# Patient Record
Sex: FEMALE | Race: WHITE | NOT HISPANIC OR LATINO | Employment: UNEMPLOYED | URBAN - METROPOLITAN AREA
[De-identification: names, ages, dates, MRNs, and addresses within clinical notes are randomized per-mention and may not be internally consistent; named-entity substitution may affect disease eponyms.]

---
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
## 2018-01-12 ENCOUNTER — OFFICE VISIT (OUTPATIENT)
Dept: PEDIATRICS | Facility: CLINIC | Age: 18
End: 2018-01-12
Payer: COMMERCIAL

## 2018-01-12 VITALS
BODY MASS INDEX: 22.78 KG/M2 | DIASTOLIC BLOOD PRESSURE: 72 MMHG | HEIGHT: 69 IN | WEIGHT: 153.8 LBS | TEMPERATURE: 97.6 F | HEART RATE: 60 BPM | OXYGEN SATURATION: 99 % | SYSTOLIC BLOOD PRESSURE: 115 MMHG

## 2018-01-12 DIAGNOSIS — Z30.42 ENCOUNTER FOR SURVEILLANCE OF INJECTABLE CONTRACEPTIVE: Primary | ICD-10-CM

## 2018-01-12 DIAGNOSIS — F41.9 ANXIETY: ICD-10-CM

## 2018-01-12 DIAGNOSIS — F32.A DEPRESSION, UNSPECIFIED DEPRESSION TYPE: ICD-10-CM

## 2018-01-12 LAB — BETA HCG QUAL IFA URINE: NEGATIVE

## 2018-01-12 PROCEDURE — 99214 OFFICE O/P EST MOD 30 MIN: CPT | Performed by: PEDIATRICS

## 2018-01-12 PROCEDURE — 87491 CHLMYD TRACH DNA AMP PROBE: CPT | Performed by: PEDIATRICS

## 2018-01-12 PROCEDURE — 84703 CHORIONIC GONADOTROPIN ASSAY: CPT | Performed by: PEDIATRICS

## 2018-01-12 RX ORDER — BUSPIRONE HYDROCHLORIDE 5 MG/1
TABLET ORAL
Qty: 150 TABLET | Refills: 0 | Status: SHIPPED | OUTPATIENT
Start: 2018-01-12 | End: 2018-04-03

## 2018-01-12 RX ORDER — MEDROXYPROGESTERONE ACETATE 150 MG/ML
150 INJECTION, SUSPENSION INTRAMUSCULAR
Qty: 3 ML | Refills: 3 | OUTPATIENT
Start: 2018-01-12 | End: 2018-04-03

## 2018-01-12 ASSESSMENT — ANXIETY QUESTIONNAIRES
6. BECOMING EASILY ANNOYED OR IRRITABLE: NEARLY EVERY DAY
GAD7 TOTAL SCORE: 11
1. FEELING NERVOUS, ANXIOUS, OR ON EDGE: MORE THAN HALF THE DAYS
2. NOT BEING ABLE TO STOP OR CONTROL WORRYING: SEVERAL DAYS
5. BEING SO RESTLESS THAT IT IS HARD TO SIT STILL: NOT AT ALL
7. FEELING AFRAID AS IF SOMETHING AWFUL MIGHT HAPPEN: SEVERAL DAYS
GAD7 TOTAL SCORE: 11
4. TROUBLE RELAXING: NEARLY EVERY DAY
7. FEELING AFRAID AS IF SOMETHING AWFUL MIGHT HAPPEN: SEVERAL DAYS
3. WORRYING TOO MUCH ABOUT DIFFERENT THINGS: SEVERAL DAYS
GAD7 TOTAL SCORE: 11

## 2018-01-12 ASSESSMENT — PATIENT HEALTH QUESTIONNAIRE - PHQ9
SUM OF ALL RESPONSES TO PHQ QUESTIONS 1-9: 16
SUM OF ALL RESPONSES TO PHQ QUESTIONS 1-9: 16
10. IF YOU CHECKED OFF ANY PROBLEMS, HOW DIFFICULT HAVE THESE PROBLEMS MADE IT FOR YOU TO DO YOUR WORK, TAKE CARE OF THINGS AT HOME, OR GET ALONG WITH OTHER PEOPLE: VERY DIFFICULT

## 2018-01-12 NOTE — PATIENT INSTRUCTIONS
Start at 5 mg twice daily for 3 days, then 7.5 mg (1.5 tabs) twice daily for 3 days, then 10 mg (2 tabs) twice daily for 3 days, then 12.5 mg (2.5 tabs) twice daily for 3 days, then 15 mg (3 tabs) twice daily and stay at that dose.

## 2018-01-12 NOTE — NURSING NOTE
The following medication was given:     MEDICATION: Medroxyprogesterone 150 mg  ROUTE: IM  SITE: [facility]  DOSE: 150 mg  LOT #: [id number]  :  Ebony  EXPIRATION DATE:  05/2019  NDC#: 1272-0149-61

## 2018-01-12 NOTE — PROGRESS NOTES
SUBJECTIVE:   Saritha Ferrera is a 17 year old female who presents to clinic today with self because of:    Chief Complaint   Patient presents with     Contraception     Recheck Medication     follow up to effexor         HPI  Concerns: Follow up to effexor, pt stopped taking the effexor due to some complications form it. Pt is late for Depo shot, was sexually active yesterday, but took a plan b     Answers for HPI/ROS submitted by the patient on 1/12/2018   If you checked off any problems, how difficult have these problems made it for you to do your work, take care of things at home, or get along with other people?: Very difficult  PHQ9 TOTAL SCORE: 16  BRUNO 7 TOTAL SCORE: 11    ==============================================================  Saritha s here with 2 concerns.  1)  Saritha her boyfriend is past due for her next Depo-Provera vaccine.  Was in [facility] for a month, but has gotten out and she has been sexually active with him in the last few days.  She did use Plan B.  No symptoms of pregnancy.  No symptoms of STD.  She would like to get restarted on Depakote.  No side effects from the upper.    2)Saritha is also here for follow-up of her depression and anxiety.  Most recently, she was on Effexor but has been off for the last 3 weeks.  She states that Effexor made her very irrational, and making her do things without thinking.  Once, she had to be talked out of doing something that was harmful to herself.  Denies any suicidal ideation thoughts, or plan.  She has been on several other antidepressant/antianxiety medications in the past.   -Prozac 40 mg daily, she discontinued it because she was getting very irritable and normal life situations, as well as having a panic attack.   -Zoloft, works well for her for a while but then she stopped it because it was making her paranoid.      She uses marijuana, but denies any other drug use.  I recommended therapy several times for her, but she does not wish to go.    Saritha did  "readily contract for safety with me today.     ROS  Constitutional, eye, ENT, skin, respiratory, cardiac, and GI are normal except as otherwise noted.      PROBLEM LIST  Patient Active Problem List    Diagnosis Date Noted     Anxiety 09/13/2017     Priority: Medium     Migraine with aura 04/10/2015     Priority: Medium     Contraception management 05/07/2014     Priority: Medium     Adjustment disorder with depressed mood 11/04/2013     Priority: Medium      MEDICATIONS  Current Outpatient Prescriptions   Medication Sig Dispense Refill     busPIRone (BUSPAR) 5 MG tablet 1 tab bid for 3 days, then 1.5 tabs bid for 3d, then 2 tabs bid for 3d, then 2.5 tabs bid for 3d, then 3 tabs bid 150 tablet 0     medroxyPROGESTERone (DEPO-PROVERA) 150 MG/ML injection Inject 1 mL (150 mg) into the muscle every 3 months 3 mL 3     medroxyPROGESTERone (DEPO-PROVERA) 150 MG/ML injection Inject 1 mL (150 mg) into the muscle every 3 months 3 mL 3     venlafaxine (EFFEXOR-XR) 37.5 MG 24 hr capsule Take 1 capsule (37.5 mg) by mouth daily (Patient not taking: Reported on 1/12/2018) 90 capsule 0      ALLERGIES  Allergies   Allergen Reactions     No Known Drug Allergies        Reviewed and updated as needed this visit by clinical staff  Tobacco  Allergies  Meds  Problems         Reviewed and updated as needed this visit by Provider  Allergies  Meds  Problems       OBJECTIVE:     /72  Pulse 60  Temp 97.6  F (36.4  C) (Oral)  Ht 5' 9\" (1.753 m)  Wt 153 lb 12.8 oz (69.8 kg)  SpO2 99%  BMI 22.71 kg/m2  97 %ile based on CDC 2-20 Years stature-for-age data using vitals from 1/12/2018.  83 %ile based on CDC 2-20 Years weight-for-age data using vitals from 1/12/2018.  61 %ile based on CDC 2-20 Years BMI-for-age data using vitals from 1/12/2018.  Blood pressure percentiles are 61.0 % systolic and 74.0 % diastolic based on NHBPEP's 4th Report.   (This patient's height is above the 95th percentile. The blood pressure percentiles " above assume this patient to be in the 95th percentile.)    GENERAL:  Alert and interactive., EYES:  Normal extra-ocular movements.  PERRLA, LUNGS:  Clear, HEART:  Normal rate and rhythm.  Normal S1 and S2.  No murmurs. and NEURO:  No tics or tremor.  Normal tone and strength. Normal gait and balance.     DIAGNOSTICS:   Results for orders placed or performed in visit on 01/12/18 (from the past 24 hour(s))   Beta HCG qual IFA urine   Result Value Ref Range    Beta HCG Qual IFA Urine Negative NEG^Negative          ASSESSMENT/PLAN:   1. Encounter for surveillance of injectable contraceptive  - Beta HCG qual IFA urine  - Chlamydia trachomatis PCR  - medroxyPROGESTERone (DEPO-PROVERA) 150 MG/ML injection; Inject 1 mL (150 mg) into the muscle every 3 months  Dispense: 3 mL; Refill: 3    2. Anxiety  - busPIRone (BUSPAR) 5 MG tablet; 1 tab bid for 3 days, then 1.5 tabs bid for 3d, then 2 tabs bid for 3d, then 2.5 tabs bid for 3d, then 3 tabs bid  Dispense: 150 tablet; Refill: 0  - MENTAL HEALTH REFERRAL  - Adult; Psychiatry and Medication Management; Psychiatry; [facility]: [facility] - [place], [place] [phone]  Medication management & future refills will be returned to [facility] PCP upon compl...    This is not effective, could also consider Remeron by itself or in addition to Effexor.,  I spoke with the [facility] psychiatric advice line in November, please see that note for detailed recommendations.      3. Depression, unspecified depression type  - MENTAL HEALTH REFERRAL  - Adult; Psychiatry and Medication Management; Psychiatry; [facility]: [facility] - [place], [place] [phone]  Medication management & future refills will be returned to [facility] PCP upon compl...    FOLLOW UP: In 1 month, if medications not effective could consider follow-up with psychiatry.  Referral made.    Sheron Meyer MD

## 2018-01-12 NOTE — MR AVS SNAPSHOT
After Visit Summary   1/12/2018    Saritha Ferrera    MRN: [id number]           Patient Information     Date Of Birth          2000        Visit Information        Provider Department      1/12/2018 1:40 PM Sheron Meyer MD [facility]        Today's Diagnoses     Encounter for surveillance of injectable contraceptive    -  1    Anxiety        Depression, unspecified depression type          Care Instructions    Start at 5 mg twice daily for 3 days, then 7.5 mg (1.5 tabs) twice daily for 3 days, then 10 mg (2 tabs) twice daily for 3 days, then 12.5 mg (2.5 tabs) twice daily for 3 days, then 15 mg (3 tabs) twice daily and stay at that dose.          Follow-ups after your visit        Additional Services     MENTAL HEALTH REFERRAL  - Adult; Psychiatry and Medication Management; Psychiatry; [facility]: Collaborative Care Psychiatry Service   [place], [place] [phone]  Medication management & future refills will be returned to [facility] PCP upon compl...       All scheduling is subject to the client's specific insurance plan & benefits, provider/location availability, and provider clinical specialities.  Please arrive 15 minutes early for your first appointment and bring your completed paperwork.    Please be aware that coverage of these services is subject to the terms and limitations of your health insurance plan.  Call member services at your health plan with any benefit or coverage questions.                            Follow-up notes from your care team     Return in about 1 month (around 2/12/2018) for recheck.      Who to contact     If you have questions or need follow up information about today's clinic visit or your schedule please contact [facility] directly at [phone].  Normal or non-critical lab and imaging results will be communicated to you by MyChart, letter or phone within 4 business days after the clinic has received the results.  "If you do not hear from us within 7 days, please contact the clinic through Contrib or phone. If you have a critical or abnormal lab result, we will notify you by phone as soon as possible.  Submit refill requests through Contrib or call your pharmacy and they will forward the refill request to us. Please allow 3 business days for your refill to be completed.          Additional Information About Your Visit        Contrib Information     Contrib lets you send messages to your doctor, view your test results, renew your prescriptions, schedule appointments and more. To sign up, go to [email or web address] . Click on \"Log in\" on the left side of the screen, which will take you to the Welcome page. Then click on \"Sign up Now\" on the right side of the page.     You will be asked to enter the access code listed below, as well as some personal information. Please follow the directions to create your username and password.     Your access code is: [email or web address]  Expires: 2018  2:33 PM     Your access code will  in 90 days. If you need help or a new code, please call your [facility] clinic or [phone].        Care EveryWhere ID     This is your Care EveryWhere ID. This could be used by other organizations to access your [facility] medical records  [id number]        Your Vitals Were     Pulse Temperature Height Pulse Oximetry BMI (Body Mass Index)       60 97.6  F (36.4  C) (Oral) 5' 9\" (1.753 m) 99% 22.71 kg/m2        Blood Pressure from Last 3 Encounters:   18 115/72   17 102/67   10/09/17 122/80    Weight from Last 3 Encounters:   18 153 lb 12.8 oz (69.8 kg) (83 %)*   17 147 lb 14.4 oz (67.1 kg) (79 %)*   10/09/17 147 lb 4.8 oz (66.8 kg) (78 %)*     * Growth percentiles are based on [facility] 2-20 Years data.              We Performed the Following     Beta HCG qual IFA urine     Chlamydia trachomatis PCR     MENTAL HEALTH REFERRAL  - Adult; Psychiatry and Medication Management; Psychiatry; " G: Collaborative Care Psychiatry Service   [place], [place] [phone]  Medication management & future refills will be returned to [facility] PCP upon compl...          Today's Medication Changes          These changes are accurate as of: 1/12/18  2:23 PM.  If you have any questions, ask your nurse or doctor.               Start taking these medicines.        Dose/Directions    busPIRone 5 MG tablet   Commonly known as:  BUSPAR   Used for:  Anxiety   Started by:  Sheron Meyer MD        1 tab bid for 3 days, then 1.5 tabs bid for 3d, then 2 tabs bid for 3d, then 2.5 tabs bid for 3d, then 3 tabs bid   Quantity:  150 tablet   Refills:  0         These medicines have changed or have updated prescriptions.        Dose/Directions    * medroxyPROGESTERone 150 MG/ML injection   Commonly known as:  DEPO-PROVERA   This may have changed:  Another medication with the same name was added. Make sure you understand how and when to take each.   Used for:  Unwanted fertility, Adjustment disorder with depressed mood   Changed by:  Sheron Meyer MD        Dose:  150 mg   Inject 1 mL (150 mg) into the muscle every 3 months   Quantity:  3 mL   Refills:  3       * medroxyPROGESTERone 150 MG/ML injection   Commonly known as:  DEPO-PROVERA   This may have changed:  You were already taking a medication with the same name, and this prescription was added. Make sure you understand how and when to take each.   Used for:  Encounter for surveillance of injectable contraceptive   Changed by:  Sheron Meyer MD        Dose:  150 mg   Inject 1 mL (150 mg) into the muscle every 3 months   Quantity:  3 mL   Refills:  3       * Notice:  This list has 2 medication(s) that are the same as other medications prescribed for you. Read the directions carefully, and ask your doctor or other care provider to review them with you.         Where to get your medicines      These medications were sent to [facility] [address] [facility] [address] AT [facility] [address]  [address], [facility] [address]    Hours:  24-hours Phone:  [phone]     busPIRone 5 MG tablet         Some of these will need a paper prescription and others can be bought over the counter.  Ask your nurse if you have questions.     You don't need a prescription for these medications     medroxyPROGESTERone 150 MG/ML injection                Primary Care Provider Office Phone # Fax #    Sheron Meyer [phone]       [address] [facility] [address]        Equal Access to Services     JOSE LAMBERT : Hadii aad ku hadasho Soomaali, waaxda luqadaha, qaybta kaalmada adeegyada, waxfransisca reyes hayaan ademonica jenkins . So [place] [phone].    ATENCIÓN: Si habla español, tiene a day disposición servicios gratuitos de asistencia lingüística. [facility] [phone].    We comply with applicable federal civil rights laws and [place] laws. We do not discriminate on the basis of race, color, national origin, age, disability, sex, sexual orientation, or gender identity.            Thank you!     Thank you for choosing [facility]  for your care. Our goal is always to provide you with excellent care. Hearing back from our patients is one way we can continue to improve our services. Please take a few minutes to complete the written survey that you may receive in the mail after your visit with us. Thank you!             Your Updated Medication List - Protect others around you: Learn how to safely use, store and throw away your medicines at www.disposemymeds.org.          This list is accurate as of: 1/12/18  2:23 PM.  Always use your most recent med list.                   Brand Name Dispense Instructions for use Diagnosis    busPIRone 5 MG tablet    BUSPAR    150 tablet    1 tab bid for 3 days, then 1.5 tabs bid for 3d, then 2 tabs bid for 3d, then 2.5 tabs bid for 3d, then 3 tabs bid    Anxiety       * medroxyPROGESTERone 150  MG/ML injection    DEPO-PROVERA    3 mL    Inject 1 mL (150 mg) into the muscle every 3 months    Unwanted fertility, Adjustment disorder with depressed mood       * medroxyPROGESTERone 150 MG/ML injection    DEPO-PROVERA    3 mL    Inject 1 mL (150 mg) into the muscle every 3 months    Encounter for surveillance of injectable contraceptive       venlafaxine 37.5 MG 24 hr capsule    EFFEXOR-XR    90 capsule    Take 1 capsule (37.5 mg) by mouth daily    Adjustment disorder with depressed mood       * Notice:  This list has 2 medication(s) that are the same as other medications prescribed for you. Read the directions carefully, and ask your doctor or other care provider to review them with you.

## 2018-01-13 ASSESSMENT — PATIENT HEALTH QUESTIONNAIRE - PHQ9: SUM OF ALL RESPONSES TO PHQ QUESTIONS 1-9: 16

## 2018-01-13 ASSESSMENT — ANXIETY QUESTIONNAIRES: GAD7 TOTAL SCORE: 11

## 2018-01-14 LAB
C TRACH DNA SPEC QL NAA+PROBE: NEGATIVE
SPECIMEN SOURCE: NORMAL

## 2018-01-15 NOTE — PROGRESS NOTES
Please call patient on her personal cell phone and let her know that her routine chlamydia testing was negative.  We recommend repeat testing every 6 months in this age group.      Electronically signed by:  Sheron Meyer MD  Pediatrics  [facility]

## 2018-02-03 ENCOUNTER — HOSPITAL ENCOUNTER (EMERGENCY)
Facility: CLINIC | Age: 18
Discharge: HOME | End: 2018-02-03
Attending: EMERGENCY MEDICINE | Admitting: EMERGENCY MEDICINE
Payer: OTHER MISCELLANEOUS

## 2018-02-03 ENCOUNTER — APPOINTMENT (OUTPATIENT)
Dept: CT IMAGING | Facility: CLINIC | Age: 18
End: 2018-02-03
Attending: EMERGENCY MEDICINE
Payer: OTHER MISCELLANEOUS

## 2018-02-03 VITALS
SYSTOLIC BLOOD PRESSURE: 112 MMHG | BODY MASS INDEX: 22.22 KG/M2 | HEART RATE: 85 BPM | TEMPERATURE: 98.8 F | WEIGHT: 150 LBS | HEIGHT: 69 IN | DIASTOLIC BLOOD PRESSURE: 74 MMHG | OXYGEN SATURATION: 98 % | RESPIRATION RATE: 16 BRPM

## 2018-02-03 DIAGNOSIS — S01.81XA FACIAL LACERATION, INITIAL ENCOUNTER: ICD-10-CM

## 2018-02-03 DIAGNOSIS — S06.0X0A CONCUSSION WITHOUT LOSS OF CONSCIOUSNESS, INITIAL ENCOUNTER: ICD-10-CM
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
## 2018-02-03 PROCEDURE — 70450 CT HEAD/BRAIN W/O DYE: CPT

## 2018-02-03 PROCEDURE — 90471 IMMUNIZATION ADMIN: CPT

## 2018-02-03 PROCEDURE — 25000132 ZZH RX MED GY IP 250 OP 250 PS 637: Performed by: EMERGENCY MEDICINE

## 2018-02-03 PROCEDURE — 99284 EMERGENCY DEPT VISIT MOD MDM: CPT | Mod: 25

## 2018-02-03 PROCEDURE — 90715 TDAP VACCINE 7 YRS/> IM: CPT | Performed by: EMERGENCY MEDICINE

## 2018-02-03 PROCEDURE — 12013 RPR F/E/E/N/L/M 2.6-5.0 CM: CPT

## 2018-02-03 PROCEDURE — 25000128 H RX IP 250 OP 636: Performed by: EMERGENCY MEDICINE

## 2018-02-03 RX ORDER — LORAZEPAM 0.5 MG/1
0.5 TABLET ORAL ONCE
Status: COMPLETED | OUTPATIENT
Start: 2018-02-03 | End: 2018-02-03

## 2018-02-03 RX ADMIN — CLOSTRIDIUM TETANI TOXOID ANTIGEN (FORMALDEHYDE INACTIVATED), CORYNEBACTERIUM DIPHTHERIAE TOXOID ANTIGEN (FORMALDEHYDE INACTIVATED), BORDETELLA PERTUSSIS TOXOID ANTIGEN (GLUTARALDEHYDE INACTIVATED), BORDETELLA PERTUSSIS FILAMENTOUS HEMAGGLUTININ ANTIGEN (FORMALDEHYDE INACTIVATED), BORDETELLA PERTUSSIS PERTACTIN ANTIGEN, AND BORDETELLA PERTUSSIS FIMBRIAE 2/3 ANTIGEN 0.5 ML: 5; 2; 2.5; 5; 3; 5 INJECTION, SUSPENSION INTRAMUSCULAR at 20:35

## 2018-02-03 RX ADMIN — LORAZEPAM 0.5 MG: 0.5 TABLET ORAL at 20:41

## 2018-02-03 ASSESSMENT — ENCOUNTER SYMPTOMS
NECK PAIN: 0
NUMBNESS: 0
WOUND: 1
CONFUSION: 1
WEAKNESS: 0
BACK PAIN: 0
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
## 2018-02-03 NOTE — ED AVS SNAPSHOT
Emergency Department    [address] [place] [address]    Phone:  [phone]    Fax:  [phone]                                       Saritha Ferrera   MRN: [id number]    Department:   Emergency Department   Date of Visit:  2/3/2018           After Visit Summary Signature Page     I have received my discharge instructions, and my questions have been answered. I have discussed any challenges I see with this plan with the nurse or doctor.    ..........................................................................................................................................  Patient/Patient Representative Signature      ..........................................................................................................................................  Patient Representative Print Name and Relationship to Patient    ..................................................               ................................................  Date                                            Time    ..........................................................................................................................................  Reviewed by Signature/Title    ...................................................              ..............................................  Date                                                            Time
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
## 2018-02-04 NOTE — DISCHARGE INSTRUCTIONS
"Concussion Discharge Instructions  You were seen today for signs of a concussion. The symptoms will vary, depending on the nature of your injury and your health. You may have: headache, confusion, nausea (feel sick to your stomach), vomiting (throwing up) and problems with memory, concentrating or sleep. You may feel dizzy, irritable, and tired.   Children and teens may need help from their parents, teachers and coaches to watch for symptoms as they recover.  Follow-up  It is important for you to see a doctor for follow-up care to see how you are recovering. Please see your primary doctor within the next 5 to 7 days. You may have also been referred to the Concussion  service. They will contact you and arrange a follow-up visit if needed. If you need help sooner, you may call them at [phone].  Warning signs  Call your doctor or come back to Emergency if you suddenly have any of these symptoms:    Headaches that get worse    Feeling more and more drowsy    You keep repeating yourself    Strange behavior    Seizures    Repeat vomiting (throwing up)    Trouble walking    Growing confusion    Feeling more irritable    Neck pain that gets worse    Slurred speech    Weakness or numbness    Loss of consciousness    Fluid or blood coming from ears or nose  Self-care    Get lots of rest and get enough sleep at night. Take daytime naps or rest if you feel tired.    Limit physical activity and \"thinking\" activities. These can make symptoms worse.    Physical activity includes gym, sports, weight training, running, exercise and heavy lifting.    Thinking activities include homework, class work, job-related work and screen time (phone, computer, tablet, TV and video games).    Stick to a healthy diet and drink lots of fluids.    As symptoms improve, you may slowly return to your daily activities. If symptoms get worse   or return, reduce your activity.    Know that it is normal to feel sad and frustrated when you do " not feel right and are less active.  Going back to work    Your care team will tell you when you are ready to return to work.    Limit the amount of work you do soon after your injury. This may speed healing. Take breaks if your symptoms get worse. You should also reduce your physical activity as well as activities that require a lot of thinking until you see your doctor.    You may need shorter work days and a lighter workload.    Avoid heavy lifting, working with machinery, driving and working at heights until your symptoms are gone or you are cleared by a doctor.  Returning to sports    Never return to play if you have any symptoms. A full recovery will reduce the chances of getting hurt again. Remember, it is better to miss one or two games than a whole season.    You should rest from all physical activity until you see your doctor. Generally, if all symptoms have completely cleared, your doctor can help guide you to slowly return to sports. If symptoms return or worsen, stop the activity and see your doctor.    Important: If you are in an organized sport and under age 18, you will need written consent from a healthcare provider before you return to sports. Typically, this will be your primary care or sports medicine doctor. Please make an appointment.  Going back to school    If you are still having symptoms, you may need extra help at school.    Tell your teachers and school nurse about your injury and symptoms. Ask them to watch for problems with learning, memory and concentrating. Symptoms may get worse when you do schoolwork, and you may become more irritable.    You may need shorter school days, a reduced workload, and to postpone testing.    Do not drive or take gym class (physical activity) until cleared by a doctor.  For informational purposes only. Not to replace the advice of your health care provider.   2009 [employer]. Used with permission. This form is adapted  "from the \"Heads Up: Brain Injury in Your Practice\" tool kit developed by the Centers for Disease Control and Prevention (CDC). All rights reserved. [employer] 911560ne - Rev 03/17.  Discharge Instructions  Laceration (Cut)    You were seen today for a laceration (cut).  Your doctor examined your laceration for any problems such a buried foreign body (like glass, a splinter, or gravel), or injury to blood vessels, tendons, and nerves.  Your doctor may have also rinsed and/or scrubbed your laceration to help prevent an infection.  Your laceration may have been closed with glue, staples or sutures (stitches).      It may not be possible to find all problems with your laceration on the first visit, and we can't always prevent infections.  Antibiotics are only given when the benefit is more than the risk, and don't prevent all infections. Some lacerations are too high risk to close, and are left open to heal.  All lacerations, no matter how expertly repaired, will cause scarring.    Return to the Emergency Department right away if:    You have more redness, swelling, pain, drainage (pus), a bad smell, or red streaking from your laceration.      You have a fever of 101oF or more.    You have bleeding that you can t stop at home. If your cut starts to bleed, hold pressure on the bleeding area with a clean cloth or put pressure over the bandage.  If the bleeding doesn t stop after using constant pressure for 30 minutes, you should return to the Emergency Department for further treatment.    An area past the laceration is cool, pale, or blue compared with the other side, or has a slower return of color when squeezed.    Your dressing seems too tight or starts to get uncomfortable or painful.    You have loss of normal function or use of an area, such as being unable to straighten or bend a finger normally.    You have a numb area past the laceration.    Return to the Emergency Department or see your " regular doctor if:    The laceration starts to come open.     You have something coming out of the cut or a feeling that there is something in the laceration.    Your wound will not heal, or keeps breaking open. There can always be glass, wood, dirt or other things in any wound.  They won t always show up, even on x-rays.  If a wound doesn t heal, this may be why, and it is important to follow-up with your regular doctor.    Home Care:    Take your dressing off in 12 hours, or as instructed by your doctor, to check your laceration. Remove the dressing sooner if it seems too tight or painful, or if it is getting numb, tingly, or pale past the dressing.    Gently wash your laceration 2 times a day with clean cloth and soap.     It is okay to shower, but do not let the laceration soak in water.      If your laceration was closed with wound adhesive or strips: pat it dry and leave it open to the air.     For all other repairs: after you wash your laceration, or at least 2 times a day, apply bacitracin or other antibiotic ointment to the laceration, then cover it with a Band-Aid  or gauze.    Keep the laceration clean. Wear gloves or other protective clothing if you are around dirt.    Follow-up:    You need to follow-up with your regular doctor in 5 days.    Your sutures or staples need to be removed in 5 days. Schedule an appointment with your regular doctor to have this done.    Scars:  To help minimize scarring:    Wear sunscreen over the healed laceration when out in the sun.    Massage the area regularly.    You may use Vitamin E oil.    Wait a year.  Most scars will start to fade within a year.
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
## 2018-02-10 ENCOUNTER — HOSPITAL ENCOUNTER (EMERGENCY)
Facility: CLINIC | Age: 18
Discharge: HOME | End: 2018-02-10
Attending: EMERGENCY MEDICINE | Admitting: EMERGENCY MEDICINE
Payer: OTHER MISCELLANEOUS

## 2018-02-10 VITALS
HEIGHT: 68 IN | RESPIRATION RATE: 16 BRPM | BODY MASS INDEX: 23.09 KG/M2 | OXYGEN SATURATION: 99 % | TEMPERATURE: 97.1 F | DIASTOLIC BLOOD PRESSURE: 84 MMHG | SYSTOLIC BLOOD PRESSURE: 114 MMHG | WEIGHT: 152.38 LBS

## 2018-02-10 DIAGNOSIS — S06.0X0A CONCUSSION WITHOUT LOSS OF CONSCIOUSNESS, INITIAL ENCOUNTER: ICD-10-CM

## 2018-02-10 PROCEDURE — 25000132 ZZH RX MED GY IP 250 OP 250 PS 637: Performed by: EMERGENCY MEDICINE

## 2018-02-10 PROCEDURE — 99283 EMERGENCY DEPT VISIT LOW MDM: CPT

## 2018-02-10 RX ORDER — IBUPROFEN 600 MG/1
600 TABLET, FILM COATED ORAL ONCE
Status: COMPLETED | OUTPATIENT
Start: 2018-02-10 | End: 2018-02-10

## 2018-02-10 RX ADMIN — IBUPROFEN 600 MG: 600 TABLET ORAL at 06:14

## 2018-02-10 ASSESSMENT — ENCOUNTER SYMPTOMS
SEIZURES: 0
DIZZINESS: 1
ABDOMINAL PAIN: 0
MYALGIAS: 0
SORE THROAT: 0
NECK PAIN: 0
NAUSEA: 1
HEADACHES: 1
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
## 2018-02-10 NOTE — ED AVS SNAPSHOT
Emergency Department    [address] [place] [address]    Phone:  [phone]    Fax:  [phone]                                       Saritha Ferrera   MRN: [id number]    Department:   Emergency Department   Date of Visit:  2/10/2018           After Visit Summary Signature Page     I have received my discharge instructions, and my questions have been answered. I have discussed any challenges I see with this plan with the nurse or doctor.    ..........................................................................................................................................  Patient/Patient Representative Signature      ..........................................................................................................................................  Patient Representative Print Name and Relationship to Patient    ..................................................               ................................................  Date                                            Time    ..........................................................................................................................................  Reviewed by Signature/Title    ...................................................              ..............................................  Date                                                            Time

## 2018-03-21 ENCOUNTER — OFFICE VISIT (OUTPATIENT)
Dept: PEDIATRICS | Facility: CLINIC | Age: 18
End: 2018-03-21
Payer: COMMERCIAL

## 2018-03-21 VITALS
HEART RATE: 70 BPM | WEIGHT: 154.4 LBS | DIASTOLIC BLOOD PRESSURE: 64 MMHG | TEMPERATURE: 98.8 F | OXYGEN SATURATION: 99 % | SYSTOLIC BLOOD PRESSURE: 92 MMHG | BODY MASS INDEX: 23.48 KG/M2
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
## 2018-03-21 DIAGNOSIS — S06.0X9S CONCUSSION WITH LOSS OF CONSCIOUSNESS, SEQUELA (H): ICD-10-CM

## 2018-03-21 DIAGNOSIS — N94.10 DYSPAREUNIA, FEMALE: ICD-10-CM

## 2018-03-21 LAB
SPECIMEN SOURCE: NORMAL
WET PREP SPEC: NORMAL

## 2018-03-21 PROCEDURE — 87210 SMEAR WET MOUNT SALINE/INK: CPT | Performed by: PEDIATRICS

## 2018-03-21 PROCEDURE — 87491 CHLMYD TRACH DNA AMP PROBE: CPT | Performed by: PEDIATRICS

## 2018-03-21 PROCEDURE — 87591 N.GONORRHOEAE DNA AMP PROB: CPT | Performed by: PEDIATRICS

## 2018-03-21 PROCEDURE — 99214 OFFICE O/P EST MOD 30 MIN: CPT | Performed by: PEDIATRICS

## 2018-03-21 ASSESSMENT — PAIN SCALES - GENERAL: PAINLEVEL: SEVERE PAIN (6)
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
## 2018-03-22 LAB
C TRACH DNA SPEC QL NAA+PROBE: NEGATIVE
N GONORRHOEA DNA SPEC QL NAA+PROBE: NEGATIVE
SPECIMEN SOURCE: NORMAL
SPECIMEN SOURCE: NORMAL

## 2018-03-23 NOTE — PROGRESS NOTES
Please call patient on her personal cell phone and let her know that her routine chlamydia and gonorrhea testing was negative.  We recommend repeat testing every 6 months in this age group.      Electronically signed by:  Sheron Meyer MD  Pediatrics  [facility]

## 2018-04-03 ENCOUNTER — OFFICE VISIT (OUTPATIENT)
Dept: PSYCHIATRY | Facility: CLINIC | Age: 18
End: 2018-04-03
Attending: PEDIATRICS
Payer: COMMERCIAL

## 2018-04-03 VITALS
TEMPERATURE: 98.4 F | DIASTOLIC BLOOD PRESSURE: 60 MMHG | HEART RATE: 101 BPM | SYSTOLIC BLOOD PRESSURE: 110 MMHG | WEIGHT: 157 LBS | HEIGHT: 69 IN | BODY MASS INDEX: 23.25 KG/M2 | OXYGEN SATURATION: 98 %

## 2018-04-03 DIAGNOSIS — F41.1 GAD (GENERALIZED ANXIETY DISORDER): Primary | ICD-10-CM

## 2018-04-03 PROBLEM — F41.9 ANXIETY: Status: RESOLVED | Noted: 2017-09-13 | Resolved: 2018-04-03

## 2018-04-03 PROCEDURE — 90792 PSYCH DIAG EVAL W/MED SRVCS: CPT | Performed by: NURSE PRACTITIONER

## 2018-04-03 RX ORDER — PROPRANOLOL HCL 60 MG
60 CAPSULE, EXTENDED RELEASE 24HR ORAL DAILY
Qty: 30 CAPSULE | Refills: 1 | Status: SHIPPED | OUTPATIENT
Start: 2018-04-03 | End: 2018-05-27

## 2018-04-03 RX ORDER — BUPROPION HYDROCHLORIDE 150 MG/1
150 TABLET ORAL EVERY MORNING
Qty: 30 TABLET | Refills: 1 | Status: SHIPPED | OUTPATIENT
Start: 2018-04-03 | End: 2018-05-27

## 2018-04-03 ASSESSMENT — ANXIETY QUESTIONNAIRES
1. FEELING NERVOUS, ANXIOUS, OR ON EDGE: SEVERAL DAYS
2. NOT BEING ABLE TO STOP OR CONTROL WORRYING: MORE THAN HALF THE DAYS
6. BECOMING EASILY ANNOYED OR IRRITABLE: MORE THAN HALF THE DAYS
IF YOU CHECKED OFF ANY PROBLEMS ON THIS QUESTIONNAIRE, HOW DIFFICULT HAVE THESE PROBLEMS MADE IT FOR YOU TO DO YOUR WORK, TAKE CARE OF THINGS AT HOME, OR GET ALONG WITH OTHER PEOPLE: SOMEWHAT DIFFICULT
7. FEELING AFRAID AS IF SOMETHING AWFUL MIGHT HAPPEN: MORE THAN HALF THE DAYS
GAD7 TOTAL SCORE: 12
3. WORRYING TOO MUCH ABOUT DIFFERENT THINGS: MORE THAN HALF THE DAYS
5. BEING SO RESTLESS THAT IT IS HARD TO SIT STILL: SEVERAL DAYS

## 2018-04-03 ASSESSMENT — PATIENT HEALTH QUESTIONNAIRE - PHQ9: 5. POOR APPETITE OR OVEREATING: MORE THAN HALF THE DAYS

## 2018-04-03 NOTE — PATIENT INSTRUCTIONS
Treatment Plan:    Start Wellbutrin (buproprion)  mg by mouth daily in AM for depression and anxiety.     Start Inderal (propranolol) LA 60 mg by mouth daily for headaches and anxiety.     Continue all other medications as reviewed per electronic medical record today.     Safety plan reviewed. To the Emergency Department as needed or call after hours crisis line at [phone] or [phone].     To schedule individual or family therapy, call [facility] at [phone].     Schedule an appointment with me in 8-10 weeks or sooner as needed.  Call [facility] at [phone] to schedule.    Follow up with primary care provider as planned or for acute medical concerns.    Call the psychiatric nurse line with medication questions or concerns at [phone].    [employer] may be used to communicate with your provider, but this is not intended to be used for emergencies.
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
## 2018-04-03 NOTE — MR AVS SNAPSHOT
After Visit Summary   4/3/2018    Saritha Ferrera    MRN: [id number]           Patient Information     Date Of Birth          2000        Visit Information        Provider Department      4/3/2018 9:15 AM Vicky Jones NP [facility]        Today's Diagnoses     BRUNO (generalized anxiety disorder)    -  1      Care Instructions      Treatment Plan:    Start Wellbutrin (buproprion)  mg by mouth daily in AM for depression and anxiety.     Start Inderal (propranolol) LA 60 mg by mouth daily for headaches and anxiety.     Continue all other medications as reviewed per electronic medical record today.     Safety plan reviewed. To the Emergency Department as needed or call after hours crisis line at [phone] or [phone].     To schedule individual or family therapy, call [facility] at [phone].     Schedule an appointment with me in 8-10 weeks or sooner as needed.  Call [facility] at [phone] to schedule.    Follow up with primary care provider as planned or for acute medical concerns.    Call the psychiatric nurse line with medication questions or concerns at [phone].    [employer] may be used to communicate with your provider, but this is not intended to be used for emergencies.          Follow-ups after your visit        Follow-up notes from your care team     Return in about 8 weeks (around 5/29/2018).      Your next 10 appointments already scheduled     Apr 11, 2018  2:30 PM CDT   Nurse Only with [facility]ATRICS - [facility]URSE   [facility]    [address] [id number] [phone]            Jun 06, 2018  2:45 PM CDT   [facility]   [facility]    [address]  [address]  [facility] [address] [phone]              Who to contact     If you have  "questions or need follow up information about today's clinic visit or your schedule please contact [facility] directly at [phone].  Normal or non-critical lab and imaging results will be communicated to you by MyChart, letter or phone within 4 business days after the clinic has received the results. If you do not hear from us within 7 days, please contact the clinic through [employer] or phone. If you have a critical or abnormal lab result, we will notify you by phone as soon as possible.  Submit refill requests through Testin or call your pharmacy and they will forward the refill request to us. Please allow 3 business days for your refill to be completed.          Additional Information About Your Visit        Revolv[employer]Askablogr Information     Testin lets you send messages to your doctor, view your test results, renew your prescriptions, schedule appointments and more. To sign up, go to [email or web address] . Click on \"Log in\" on the left side of the screen, which will take you to the Welcome page. Then click on \"Sign up Now\" on the right side of the page.     You will be asked to enter the access code listed below, as well as some personal information. Please follow the directions to create your username and password.     Your access code is: [email or web address]  Expires: 2018 11:11 PM     Your access code will  in 90 days. If you need help or a new code, please call your [facility] clinic or [phone].        Care EveryWhere ID     This is your Care EveryWhere ID. This could be used by other organizations to access your [facility] medical records  [id number]        Your Vitals Were     Pulse Temperature Height Last Period Pulse Oximetry BMI (Body Mass Index)    101 98.4  F (36.9  C) (Oral) 5' 8.5\" (1.74 m) 02/15/2018 (Approximate) 98% 23.52 kg/m2       Blood Pressure from Last 3 Encounters:   18 110/60   18 92/64   02/10/18 114/84    Weight from Last 3 Encounters:   18 157 lb " (71.2 kg)   03/21/18 154 lb 6.4 oz (70 kg)   02/10/18 152 lb 6 oz (69.1 kg) (82 %)*     * Growth percentiles are based on [facility] 2-20 Years data.              Today, you had the following     No orders found for display         Today's Medication Changes          These changes are accurate as of 4/3/18  1:19 PM.  If you have any questions, ask your nurse or doctor.               Start taking these medicines.        Dose/Directions    buPROPion 150 MG 24 hr tablet   Commonly known as:  WELLBUTRIN XL   Used for:  BRUNO (generalized anxiety disorder)   Started by:  Vicky Jones NP        Dose:  150 mg   Take 1 tablet (150 mg) by mouth every morning   Quantity:  30 tablet   Refills:  1       propranolol 60 MG 24 hr capsule   Commonly known as:  INDERAL LA   Used for:  BRUNO (generalized anxiety disorder)   Started by:  Vicky Jones NP        Dose:  60 mg   Take 1 capsule (60 mg) by mouth daily   Quantity:  30 capsule   Refills:  1            Where to get your medicines      These medications were sent to [facility] [address] [place] [address] [address] [facility] [place] [address] [address], [facility] [address]    Hours:  24-hours Phone:  [phone]     buPROPion 150 MG 24 hr tablet    propranolol 60 MG 24 hr capsule                Primary Care Provider Office Phone # Fax #    Sheron Meyer [phone]       [address] [facility] [address]        Equal Access to Services     JOSE LAMBERT AH: Hadii aad ku hadasho Soomaali, waaxda luqadaha, qaybta kaalmada adeegyada, waxay eric haycassandra awan. So [facility] [phone].    ATENCIÓN: Si habla español, tiene a day disposición servicios gratuitos de asistencia lingüística. Llame al [phone].    We comply with applicable federal civil rights laws and [place] laws. We do not discriminate on the basis of race, color, national origin, age, disability, sex, sexual orientation, or gender identity.            Thank you!      Thank you for choosing [facility]  for your care. Our goal is always to provide you with excellent care. Hearing back from our patients is one way we can continue to improve our services. Please take a few minutes to complete the written survey that you may receive in the mail after your visit with us. Thank you!             Your Updated Medication List - Protect others around you: Learn how to safely use, store and throw away your medicines at www.disposemymeds.org.          This list is accurate as of 4/3/18  1:19 PM.  Always use your most recent med list.                   Brand Name Dispense Instructions for use Diagnosis    buPROPion 150 MG 24 hr tablet    WELLBUTRIN XL    30 tablet    Take 1 tablet (150 mg) by mouth every morning    BRUNO (generalized anxiety disorder)       medroxyPROGESTERone 150 MG/ML injection    DEPO-PROVERA    3 mL    Inject 1 mL (150 mg) into the muscle every 3 months    Unwanted fertility, Adjustment disorder with depressed mood       propranolol 60 MG 24 hr capsule    INDERAL LA    30 capsule    Take 1 capsule (60 mg) by mouth daily    BRUNO (generalized anxiety disorder)

## 2018-04-03 NOTE — Clinical Note
Devan Meyer Thank you for the Psychiatry referral to the [facility] ([facility]). Our psychiatry providers act as a specialty service for Primary Care Providers in the [facility] that seek to optimize medications for unstable patients.  Once medications have been optimized, our providers discharge the patient back to the referring Primary Care Provider for ongoing medication management.  This type of system allows our providers to serve a high volume of patients.   Please see my Impression and Plan.  If you have any questions or concerns, please let me know.  Vicky

## 2018-04-04 ASSESSMENT — PATIENT HEALTH QUESTIONNAIRE - PHQ9: SUM OF ALL RESPONSES TO PHQ QUESTIONS 1-9: 21

## 2018-04-04 ASSESSMENT — ANXIETY QUESTIONNAIRES: GAD7 TOTAL SCORE: 12

## 2018-04-11 ENCOUNTER — ALLIED HEALTH/NURSE VISIT (OUTPATIENT)
Dept: NURSING | Facility: CLINIC | Age: 18
End: 2018-04-11
Payer: COMMERCIAL

## 2018-04-11 DIAGNOSIS — Z30.42 ENCOUNTER FOR SURVEILLANCE OF INJECTABLE CONTRACEPTIVE: Primary | ICD-10-CM

## 2018-04-11 PROCEDURE — 96372 THER/PROPH/DIAG INJ SC/IM: CPT

## 2018-05-10 ENCOUNTER — TELEPHONE (OUTPATIENT)
Dept: PEDIATRICS | Facility: CLINIC | Age: 18
End: 2018-05-10

## 2018-05-10 NOTE — TELEPHONE ENCOUNTER
"Reason for Call:  Request for results:    Name of test or procedure: STD testing    Date of test of procedure: \"most recent testing\"    Location of the test or procedure: [facility]    OK to leave the result message on voice mail or with a family member? YES    Phone number Patient can be reached at:  Cell number on file:    Telephone Information:   Mobile [phone]       Additional comments: Patient will  printed results at  when ready. Patient advised she will be called when ready to .    Call taken on 5/10/2018 at 9:26 AM by Jodee Mercado    "

## 2018-05-27 DIAGNOSIS — F41.1 GAD (GENERALIZED ANXIETY DISORDER): ICD-10-CM

## 2018-05-29 RX ORDER — BUPROPION HYDROCHLORIDE 150 MG/1
TABLET ORAL
Qty: 30 TABLET | Refills: 0 | Status: SHIPPED | OUTPATIENT
Start: 2018-05-29 | End: 2018-06-06

## 2018-05-29 RX ORDER — PROPRANOLOL HCL 60 MG
CAPSULE, EXTENDED RELEASE 24HR ORAL
Qty: 30 CAPSULE | Refills: 0 | Status: SHIPPED | OUTPATIENT
Start: 2018-05-29 | End: 2018-06-06

## 2018-06-06 ENCOUNTER — OFFICE VISIT (OUTPATIENT)
Dept: PSYCHIATRY | Facility: CLINIC | Age: 18
End: 2018-06-06
Payer: COMMERCIAL

## 2018-06-06 VITALS
RESPIRATION RATE: 20 BRPM | DIASTOLIC BLOOD PRESSURE: 60 MMHG | WEIGHT: 155 LBS | HEART RATE: 107 BPM | OXYGEN SATURATION: 98 % | SYSTOLIC BLOOD PRESSURE: 92 MMHG | HEIGHT: 69 IN | TEMPERATURE: 98.5 F | BODY MASS INDEX: 22.96 KG/M2

## 2018-06-06 DIAGNOSIS — F33.1 MAJOR DEPRESSIVE DISORDER, RECURRENT EPISODE, MODERATE (H): Primary | ICD-10-CM

## 2018-06-06 DIAGNOSIS — F41.1 GAD (GENERALIZED ANXIETY DISORDER): ICD-10-CM

## 2018-06-06 PROBLEM — F50.9 EATING DISORDER: Status: ACTIVE | Noted: 2018-06-06

## 2018-06-06 PROBLEM — F12.10 CANNABIS ABUSE, DAILY USE: Status: ACTIVE | Noted: 2018-06-06

## 2018-06-06 PROCEDURE — 99214 OFFICE O/P EST MOD 30 MIN: CPT | Performed by: NURSE PRACTITIONER

## 2018-06-06 RX ORDER — BUPROPION HYDROCHLORIDE 150 MG/1
150 TABLET ORAL EVERY MORNING
Qty: 30 TABLET | Refills: 3 | Status: SHIPPED | OUTPATIENT
Start: 2018-06-06 | End: 2018-08-20

## 2018-06-06 RX ORDER — PROPRANOLOL HCL 60 MG
60 CAPSULE, EXTENDED RELEASE 24HR ORAL DAILY
Qty: 30 CAPSULE | Refills: 3 | Status: SHIPPED | OUTPATIENT
Start: 2018-06-06 | End: 2018-09-24

## 2018-06-06 RX ORDER — CALCIUM CARBONATE 500 MG/1
1 TABLET, CHEWABLE ORAL 2 TIMES DAILY
COMMUNITY
End: 2021-01-20

## 2018-06-06 ASSESSMENT — ANXIETY QUESTIONNAIRES
GAD7 TOTAL SCORE: 5
6. BECOMING EASILY ANNOYED OR IRRITABLE: MORE THAN HALF THE DAYS
3. WORRYING TOO MUCH ABOUT DIFFERENT THINGS: SEVERAL DAYS
2. NOT BEING ABLE TO STOP OR CONTROL WORRYING: NOT AT ALL
GAD7 TOTAL SCORE: 5
1. FEELING NERVOUS, ANXIOUS, OR ON EDGE: SEVERAL DAYS
7. FEELING AFRAID AS IF SOMETHING AWFUL MIGHT HAPPEN: NOT AT ALL
4. TROUBLE RELAXING: SEVERAL DAYS
5. BEING SO RESTLESS THAT IT IS HARD TO SIT STILL: NOT AT ALL
7. FEELING AFRAID AS IF SOMETHING AWFUL MIGHT HAPPEN: NOT AT ALL
GAD7 TOTAL SCORE: 5

## 2018-06-06 ASSESSMENT — PATIENT HEALTH QUESTIONNAIRE - PHQ9
10. IF YOU CHECKED OFF ANY PROBLEMS, HOW DIFFICULT HAVE THESE PROBLEMS MADE IT FOR YOU TO DO YOUR WORK, TAKE CARE OF THINGS AT HOME, OR GET ALONG WITH OTHER PEOPLE: SOMEWHAT DIFFICULT
SUM OF ALL RESPONSES TO PHQ QUESTIONS 1-9: 4
SUM OF ALL RESPONSES TO PHQ QUESTIONS 1-9: 4

## 2018-06-06 NOTE — PATIENT INSTRUCTIONS
Treatment Plan:    Continue Wellbutrin (buproprion)  mg by mouth daily in AM for depression and anxiety.     Continue Inderal (propranolol) LA 60 mg by mouth daily for headaches and anxiety.     Continue all other medications as reviewed per electronic medical record today.     Safety plan reviewed. To the Emergency Department as needed or call after hours crisis line at [phone] or [phone]. [place] Crisis Text Line. Text [place] to 260719 or Suicide LifeLine Chat: suicidepreventionlifeline.org/chat    To schedule individual or family therapy, call [facility] at [phone].     Thank you for our work together in the Psychiatry Collaborative Care Model at [facility]. This is our last visit and I am returning your care back to your Primary Care Provider Sheron Meyer MD. If you are not doing well, please contact your Primary Care Provider office.     Follow up with primary care provider as planned or for acute medical concerns. Recommend follow up with Primary Care Provider 3-6 months.     Call the psychiatric nurse line with medication questions or concerns at [phone].

## 2018-06-06 NOTE — MR AVS SNAPSHOT
After Visit Summary   6/6/2018    Saritha Ferrera    MRN: [id number]           Patient Information     Date Of Birth          2000        Visit Information        Provider Department      6/6/2018 2:45 PM Vicky Jones NP [facility]        Today's Diagnoses     Major depressive disorder, recurrent episode, moderate (H)    -  1    BRUNO (generalized anxiety disorder)          Care Instructions    Treatment Plan:    Continue Wellbutrin (buproprion)  mg by mouth daily in AM for depression and anxiety.     Continue Inderal (propranolol) LA 60 mg by mouth daily for headaches and anxiety.     Continue all other medications as reviewed per electronic medical record today.     Safety plan reviewed. To the Emergency Department as needed or call after hours crisis line at [phone] or [phone]. [place] Crisis Text Line. Text [place] to 156596 or Suicide LifeLine Chat: suicidepreventionlifeline.org/chat    To schedule individual or family therapy, call [facility] at [phone].     Thank you for our work together in the Psychiatry Collaborative Care Model at [facility]. This is our last visit and I am returning your care back to your Primary Care Provider Sheron Meyer MD. If you are not doing well, please contact your Primary Care Provider office.     Follow up with primary care provider as planned or for acute medical concerns. Recommend follow up with Primary Care Provider 3-6 months.     Call the psychiatric nurse line with medication questions or concerns at [phone].          Follow-ups after your visit        Your next 10 appointments already scheduled     Jun 27, 2018  2:30 PM CDT   Nurse Only with [facility]ATRICS - [facility]URSE   [facility]    [address] [address] [phone]              Who to contact     If you have questions or need follow up  "information about today's clinic visit or your schedule please contact [facility] directly at [phone].  Normal or non-critical lab and imaging results will be communicated to you by MyChart, letter or phone within 4 business days after the clinic has received the results. If you do not hear from us within 7 days, please contact the clinic through MyChart or phone. If you have a critical or abnormal lab result, we will notify you by phone as soon as possible.  Submit refill requests through [employer] or call your pharmacy and they will forward the refill request to us. Please allow 3 business days for your refill to be completed.          Additional Information About Your Visit        Care EveryWhere ID     This is your Care EveryWhere ID. This could be used by other organizations to access your [facility] medical records  [id number]        Your Vitals Were     Pulse Temperature Respirations Height Last Period Pulse Oximetry    107 98.5  F (36.9  C) (Oral) 20 5' 8.5\" (1.74 m) 06/04/2018 (Approximate) 98%    BMI (Body Mass Index)                   23.22 kg/m2            Blood Pressure from Last 3 Encounters:   06/06/18 92/60   04/03/18 110/60   03/21/18 92/64    Weight from Last 3 Encounters:   06/06/18 155 lb (70.3 kg)   04/03/18 157 lb (71.2 kg)   03/21/18 154 lb 6.4 oz (70 kg)              Today, you had the following     No orders found for display         Today's Medication Changes          These changes are accurate as of 6/6/18  3:15 PM.  If you have any questions, ask your nurse or doctor.               These medicines have changed or have updated prescriptions.        Dose/Directions    buPROPion 150 MG 24 hr tablet   Commonly known as:  WELLBUTRIN XL   This may have changed:  See the new instructions.   Used for:  BRUNO (generalized anxiety disorder)   Changed by:  Vicky Jones NP        Dose:  150 mg   Take 1 tablet (150 mg) by mouth every morning   Quantity:  30 tablet   Refills:  3 "       propranolol 60 MG 24 hr capsule   Commonly known as:  INDERAL LA   This may have changed:  See the new instructions.   Used for:  BRUNO (generalized anxiety disorder)   Changed by:  Vicky Jones, CR        Dose:  60 mg   Take 1 capsule (60 mg) by mouth daily   Quantity:  30 capsule   Refills:  3            Where to get your medicines      These medications were sent to [facility] [address] [place] [address] [address] AT [facility] [address], [facility] [address]     Phone:  [phone]     buPROPion 150 MG 24 hr tablet    propranolol 60 MG 24 hr capsule                Primary Care Provider Office Phone # Fax #    Sheron Meyer [phone]       [address] [facility] [address]        Equal Access to Services     JOSE LAMBERT : Hadii zurdo snyder hadasho Soomaali, waaxda luqadaha, qaybta kaalmada adeegyada, yaima jenkins . So [facility] [phone].    ATENCIÓN: Si habla español, tiene a day disposición servicios gratuitos de asistencia lingüística. [facility] [phone].    We comply with applicable federal civil rights laws and [place] laws. We do not discriminate on the basis of race, color, national origin, age, disability, sex, sexual orientation, or gender identity.            Thank you!     Thank you for choosing [facility]  for your care. Our goal is always to provide you with excellent care. Hearing back from our patients is one way we can continue to improve our services. Please take a few minutes to complete the written survey that you may receive in the mail after your visit with us. Thank you!             Your Updated Medication List - Protect others around you: Learn how to safely use, store and throw away your medicines at www.disposemymeds.org.          This list is accurate as of 6/6/18  3:15 PM.  Always use your most recent med list.                   Brand Name Dispense Instructions for use Diagnosis     ADVIL PO           buPROPion 150 MG 24 hr tablet    WELLBUTRIN XL    30 tablet    Take 1 tablet (150 mg) by mouth every morning    BRUNO (generalized anxiety disorder)       calcium carbonate 500 MG chewable tablet    TUMS     Take 1 chew tab by mouth 2 times daily        medroxyPROGESTERone 150 MG/ML injection    DEPO-PROVERA    3 mL    Inject 1 mL (150 mg) into the muscle every 3 months    Unwanted fertility, Adjustment disorder with depressed mood       propranolol 60 MG 24 hr capsule    INDERAL LA    30 capsule    Take 1 capsule (60 mg) by mouth daily    BRUNO (generalized anxiety disorder)

## 2018-06-06 NOTE — NURSING NOTE
"Chief Complaint   Patient presents with     RECHECK     pt doing well no new concerns     initial BP 92/60 (BP Location: Right arm, Patient Position: Chair, Cuff Size: Adult Regular)  Pulse 107  Temp 98.5  F (36.9  C) (Oral)  Resp 20  Ht 5' 8.5\" (1.74 m)  Wt 155 lb (70.3 kg)  LMP 06/04/2018 (Approximate)  SpO2 98%  BMI 23.22 kg/m2 Estimated body mass index is 23.22 kg/(m^2) as calculated from the following:    Height as of this encounter: 5' 8.5\" (1.74 m).    Weight as of this encounter: 155 lb (70.3 kg)..  bp completed using cuff size regular    "

## 2018-06-06 NOTE — PROGRESS NOTES
"    Outpatient Psychiatric Progress Note    Name: Saritha Ferrera   : 2000                    Primary Care Provider: Sheron Meyer MD - last visit 3/21/2018  Therapist: None    PHQ-9 scores:  PHQ-9 SCORE 2018 4/3/2018 2018   Total Score 16 21 4       BRUNO-7 scores:  BRUNO-7 SCORE 2018 4/3/2018 2018   Total Score 11 12 5     Answers for HPI/ROS submitted by the patient on 2018   If you checked off any problems, how difficult have these problems made it for you to do your work, take care of things at home, or get along with other people?: Somewhat difficult      Patient Identification:  Patient is a 18 year old year old, partnered / significant other  White American female  who presents for return visit with me.  Patient is currently employed part time. Patient attended the session alone. Patient prefers to be called: \"Em\".    Interim History:    I last saw Saritha Ferrera for outpatient psychiatry Consultation on 4/3/2018.     During that appointment, we Start Wellbutrin (buproprion)  mg by mouth daily in AM for depression and anxiety.     Start Inderal (propranolol) LA 60 mg by mouth daily for headaches and anxiety.      Current medications include:   Current Outpatient Prescriptions   Medication Sig     buPROPion (WELLBUTRIN XL) 150 MG 24 hr tablet TAKE 1 TABLET(150 MG) BY MOUTH EVERY MORNING     medroxyPROGESTERone (DEPO-PROVERA) 150 MG/ML injection Inject 1 mL (150 mg) into the muscle every 3 months     propranolol (INDERAL LA) 60 MG 24 hr capsule TAKE 1 CAPSULE(60 MG) BY MOUTH DAILY     No current facility-administered medications for this visit.        The [place] Prescription Monitoring Program has been reviewed and there are no concerns about diversionary activity for controlled substances at this time.  No data.     I was able to review most recent Primary Care Provider, specialty provider, and therapy visit notes that I have access to.     Saritha Ferrera reports mood has " "been: \"pretty good\" no depression. No hypomania or krystal.   Eating- low appetite continues. Tries to tell self to not eat as much food. Patient would like to stay under a weight of 150 pounds.   Anxiety has been: \"some panic\" some irritability about 2 times per week. No anger or aggression. No paranoia.   Sleep has been: \"some trouble with falling asleep\" wide awake before. Trying to reduce caffeine to help.   PHQ9 and GAD7 scores were reviewed today. Scores have improved.   Medication side effects: Denies. Denies missing doses of medications  Current stressors include: Mom's health, Financial Difficulties, Relationship Difficulties, Occupational Difficulties- work going better  Coping mechanisms and supports include: Boyfriend, Friend    Previous medication trials include but not limited to:  Effexor (venlafaxine)   Buspar (buspirone)   Prozac (fluoxetine) stopped by patient because \"irritable\"  Zoloft (sertraline) \"worked for awhile and it was making her paranoid\"  Neurontin (gabapentin) from mother for headaches- wanted to use this a lot so just stopped it in 2015  Wellbutrin (buproprion)   Inderal (propranolol)     Past Medical History:   Diagnosis Date     Adjustment disorder with depressed mood 11/4/2013     Hx of migraines      Migraine with aura 4/10/2015      has a past medical history of Adjustment disorder with depressed mood (11/4/2013); migraines; and Migraine with aura (4/10/2015).    Social History:  Current Living situation:  [place], [place] with Mother, Sister, and sister's 2 children and pet cat. Feels safe at home.  Current use of drugs or alcohol: Cannabis using daily. Started using at age 12 years old. Never quit. Alcohol socially. Cocaine 1-2 times in last year. History of LSD/acid use for 3 months in 2016 and then in 2014. Caused weight loss.   Tobacco use: Yes E-cigarettes once per day for the last month. History of cigarette us in 2014. Ready to quit?  No   Caffeine:  Yes  1 liter soda every " "other day- gets migraines without caffeine- using less caffeine recently     Vital Signs:   BP 92/60 (BP Location: Right arm, Patient Position: Chair, Cuff Size: Adult Regular)  Pulse 107  Temp 98.5  F (36.9  C) (Oral)  Resp 20  Ht 5' 8.5\" (1.74 m)  Wt 155 lb (70.3 kg)  LMP 06/04/2018 (Approximate)  SpO2 98%  BMI 23.22 kg/m2    Labs:  Most recent laboratory results reviewed and the pertinent results include:   Office Visit on 03/21/2018   Component Date Value Ref Range Status     Specimen Description 03/21/2018 Urine   Final     Chlamydia Trachomatis PCR 03/21/2018 Negative  NEG^Negative Final    Comment: Negative for C. trachomatis rRNA by transcription mediated amplification.  A negative result by transcription mediated amplification does not preclude   the presence of C. trachomatis infection because results are dependent on   proper and adequate collection, absence of inhibitors, and sufficient rRNA to   be detected.       Specimen Descrip 03/21/2018 Urine   Final     N Gonorrhea PCR 03/21/2018 Negative  NEG^Negative Final    Comment: Negative for N. gonorrhoeae rRNA by transcription mediated amplification.  A negative result by transcription mediated amplification does not preclude   the presence of N. gonorrhoeae infection because results are dependent on   proper and adequate collection, absence of inhibitors, and sufficient rRNA to   be detected.       Specimen Description 03/21/2018 Vagina   Final     Wet Prep 03/21/2018 No yeast seen   Final     Wet Prep 03/21/2018 No Trichomonas seen   Final     Wet Prep 03/21/2018 No clue cells seen   Final         Review of Systems:  10 systems (general, cardiovascular, respiratory, eyes, ENT, endocrine, GI, , M/S, neurological) were reviewed. Most pertinent finding(s) is/are: headaches a few day per week, no dizziness or falls . The remaining systems are all unremarkable.    Mental Status Examination:  Appearance:  awake, alert, appeared stated age, no " "apparent distress, normal weight, casually dressed, slightly unkempt  Attitude:  cooperative   Eye Contact:  adequate  Gait and Station: Normal, No assistive Devices used and Dizziness reported  Psychomotor Behavior:  no evidence of tardive dyskinesia, dystonia, or tics and physical retardation  Oriented to:  time, person, and place  Attention Span and Concentration:  Normal  Speech:  clear, coherent, regular rate and regular rhythm  Mood:  \"pretty good\"  Affect:  euthymic  Associations:  no loose associations  Thought Process:  logical, linear and goal oriented  Thought Content:  no evidence of psychotic thought and passive suicidal ideation present  Recent and Remote Memory:  Memory loss secondary to concussion in February. Not formally assessed. No amnesia.  Fund of Knowledge: appropriate  Insight:  fair  Judgment:  adequate for safety  Impulse Control:  fair     Suicide Risk Assessment:  Today Saritha Ferrera reports no thoughts of self harm or suicidal thoughts. In addition, there are notable risk factors for self-harm, including age, anxiety, substance abuse, previous history of suicide attempts and suicidal ideation. However, risk is mitigated by commitment to family, ability to volunteer a safety plan, history of seeking help when needed, future oriented, no access to firearms or weapons, denies suicidal intent or plan, no family history of suicide and denies homicidal ideation, intent, or plan. Therefore, based on all available evidence including the factors cited above, Saritha Ferrera does not appear to be at imminent risk for self-harm, does not meet criteria for a 72-hr hold, and therefore remains appropriate for ongoing outpatient level of care.  A thorough assessment of risk factors related to suicide and self-harm have been reviewed and are noted above. The patient convincingly denies acute suicidality on several occasions. Local community safety resources reviewed and printed for patient to use if " needed. There was no deceit detected, and the patient presented in a manner that was believable.      DSM5  Diagnosis:  296.32 (F33.1) Major Depressive Disorder, Recurrent Episode, Moderate   300.02 (F41.1) Generalized Anxiety Disorder  Eating Disorder unspecified- restricting, binging, purging   Cannabis Abuse    Medical comorbidities include:   Patient Active Problem List    Diagnosis Date Noted     Migraine with aura 04/10/2015     Priority: Medium     Contraception management 05/07/2014     Priority: Medium     Adjustment disorder with depressed mood 11/04/2013     Priority: Medium       Psychosocial & Contextual Factors:  Occupational Difficulties, Parent/Child Relationship Difficulties, Financial Difficulties, Relationship Difficulties and Medical Comorbidites    Assessment:  Saritha Ferrera reports improved mood, less anxiety, but some irritabiltiy. May be from Wellbutrin (buproprion) but is manageable. Continue to monitor. Medication side effects and alternatives were reviewed. Health promotion activities recommended and reviewed today. All questions addressed. Education and counseling completed regarding risks and benefits of medications and psychotherapy options. Recommend therapy for additional support. Referred to [facility] today.     Patient has also not had any recent lab draws to rule out other medical issues contributing to her symptoms. Could consider in collaboration with Primary Care Provider.     Reviewed I will be out on medical leave starting in a few weeks and reviewed the Collaborative Care Psychiatry Service model. Discussed return to Primary Care Provider or referral to long term community psychiatry. Will return back to Primary Care Provider.     Treatment Plan:    Continue Wellbutrin (buproprion)  mg by mouth daily in AM for depression and anxiety. May consider dose increase if needed.     Continue Inderal (propranolol) LA 60 mg by mouth daily for headaches and anxiety. May consider dose  increase if needed.     Continue all other medications as reviewed per electronic medical record today.     Safety plan reviewed. To the Emergency Department as needed or call after hours crisis line at [phone] or [phone]. [place] Crisis Text Line. Text [place] to 813722 or Suicide LifeLine Chat: suicidepreventionlifeline.org/chat    To schedule individual or family therapy, call [facility] at [phone].     Thank you for our work together in the Psychiatry Collaborative Care Model at [facility]. This is our last visit and I am returning your care back to your Primary Care Provider Sheron Meyer MD. If you are not doing well, please contact your Primary Care Provider office.     Follow up with primary care provider as planned or for acute medical concerns. Recommend follow up with Primary Care Provider 3-6 months.     Call the psychiatric nurse line with medication questions or concerns at [phone].    Administrative Billing:   Time spent with patient was 30 minutes and greater than 50% of time or 20 minutes was spent in counseling and coordination of care regarding above diagnoses and treatment plan.    Patient Status:  The patient is being returned to the referring provider for ongoing care and medication prescribing.  The patient can be referred back to this service for further consultation as needed.    Signed:   Vicky Jones, PhD, APRN, CNP   Psychiatry

## 2018-06-07 ASSESSMENT — PATIENT HEALTH QUESTIONNAIRE - PHQ9: SUM OF ALL RESPONSES TO PHQ QUESTIONS 1-9: 4

## 2018-06-07 ASSESSMENT — ANXIETY QUESTIONNAIRES: GAD7 TOTAL SCORE: 5

## 2018-06-14 ENCOUNTER — TELEPHONE (OUTPATIENT)
Dept: PSYCHIATRY | Facility: CLINIC | Age: 18
End: 2018-06-14

## 2018-06-14 NOTE — TELEPHONE ENCOUNTER
----- Message from Mariela Hay sent at 6/14/2018 10:35 AM CDT -----  Regarding: MENTAL HEALTH ORDER  Several attempts have been made to contact the patient. A letter has been sent advising the patient to contact [facility].    Mariela Hay  Intake Assistant, [facility]

## 2018-06-20 ENCOUNTER — OFFICE VISIT (OUTPATIENT)
Dept: OBGYN | Facility: CLINIC | Age: 18
End: 2018-06-20
Payer: COMMERCIAL

## 2018-06-20 VITALS
DIASTOLIC BLOOD PRESSURE: 70 MMHG | HEIGHT: 69 IN | WEIGHT: 152 LBS | BODY MASS INDEX: 22.51 KG/M2 | SYSTOLIC BLOOD PRESSURE: 100 MMHG

## 2018-06-20 DIAGNOSIS — N93.9 VAGINAL BLEEDING: Primary | ICD-10-CM

## 2018-06-20 DIAGNOSIS — N89.8 VAGINAL DISCHARGE: ICD-10-CM

## 2018-06-20 LAB
BETA HCG QUAL IFA URINE: NEGATIVE
SPECIMEN SOURCE: NORMAL
WET PREP SPEC: NORMAL

## 2018-06-20 PROCEDURE — 84703 CHORIONIC GONADOTROPIN ASSAY: CPT | Performed by: OBSTETRICS & GYNECOLOGY

## 2018-06-20 PROCEDURE — 99214 OFFICE O/P EST MOD 30 MIN: CPT | Performed by: OBSTETRICS & GYNECOLOGY

## 2018-06-20 PROCEDURE — 87591 N.GONORRHOEAE DNA AMP PROB: CPT | Performed by: OBSTETRICS & GYNECOLOGY

## 2018-06-20 PROCEDURE — 87491 CHLMYD TRACH DNA AMP PROBE: CPT | Performed by: OBSTETRICS & GYNECOLOGY

## 2018-06-20 PROCEDURE — 87210 SMEAR WET MOUNT SALINE/INK: CPT | Performed by: OBSTETRICS & GYNECOLOGY

## 2018-06-20 NOTE — NURSING NOTE
"Chief Complaint   Patient presents with     Abnormal Bleeding Problem     On Depo       Initial /70 (BP Location: Right arm, Patient Position: Chair, Cuff Size: Adult Regular)  Ht 5' 8.5\" (1.74 m)  Wt 152 lb (68.9 kg)  LMP 2018 (Exact Date)  BMI 22.78 kg/m2 Estimated body mass index is 22.78 kg/(m^2) as calculated from the following:    Height as of this encounter: 5' 8.5\" (1.74 m).    Weight as of this encounter: 152 lb (68.9 kg).  BP completed using cuff size: regular        The following HM Due: NONE    Jessica Frye CMA         "

## 2018-06-20 NOTE — PROGRESS NOTES
SUBJECTIVE:                                                   Saritha Ferrera is a 18 year old female who presents to clinic today for the following health issue(s):  Patient presents with:  Abnormal Bleeding Problem: On Depo      HPI:    Here for persistent bleeding.    Patient's last menstrual period was 2018 (exact date), lasted 1-2 days. Then started bleeding again on 6/3/18. Bleeding has been on and off again since the beginning of .   Wears tampons.   Light brown mucous discharge. No bleeding/discharge last two days.    Patient is sexually active, . Has had two partners in last year, one current partner.  Early May had a condom break.   Took two pregnancy tests last week - both negative.   Denies history of STI.    Using depo provera for the last 4 years. Due 18.     Problem list and histories reviewed & adjusted, as indicated.  Additional history: as documented.    Patient Active Problem List   Diagnosis     Adjustment disorder with depressed mood     Contraception management     Migraine with aura     Major depressive disorder, recurrent episode, moderate (H)     BRUNO (generalized anxiety disorder)     Cannabis abuse, daily use     Eating disorder     Past Surgical History:   Procedure Laterality Date     NO HISTORY OF SURGERY        Social History   Substance Use Topics     Smoking status: Light Tobacco Smoker     Smokeless tobacco: Current User      Comment: Mom smokes outside. 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      Problem (# of Occurrences) Relation (Name,Age of Onset)    Alcohol/Drug (1) Sister    Other - See Comments (2) Mother: Anorexia, Sister: anger issues              Current Outpatient Prescriptions on File Prior to Visit:  buPROPion (WELLBUTRIN XL) 150 MG 24 hr tablet Take 1 tablet (150 mg) by mouth every morning   calcium carbonate (TUMS) 500 MG chewable tablet Take 1 chew tab by mouth 2  "times daily   Ibuprofen (ADVIL PO)    medroxyPROGESTERone (DEPO-PROVERA) 150 MG/ML injection Inject 1 mL (150 mg) into the muscle every 3 months   propranolol (INDERAL LA) 60 MG 24 hr capsule Take 1 capsule (60 mg) by mouth daily     No current facility-administered medications on file prior to visit.   Allergies   Allergen Reactions     No Known Drug Allergies        ROS:  12 point ROS negative except as noted above in HPI, including Gen., Resp., CV, GI &  system review.    OBJECTIVE:     /70 (BP Location: Right arm, Patient Position: Chair, Cuff Size: Adult Regular)  Ht 5' 8.5\" (1.74 m)  Wt 152 lb (68.9 kg)  LMP 05/29/2018 (Exact Date)  BMI 22.78 kg/m2   BMI: Body mass index is 22.78 kg/(m^2).  General: Alert and oriented, no distress.  Psychiatric: Mood and affect within normal limits.  Skin: Warm and dry, no lesions, rashes or discolorations.  Neck: Neck supple. Thyroid palpbably normal in size and without nodularity.  Abdomen: Soft, nontender, no hepatosplenomegaly, no rebound or guarding, no masses, no hernias.   Vulva:  No external lesions, normal female hair distribution, no inguinal adenopathy. Small amount of thicker than usual white discharge outer edge right labia.   Urethra:  Midline, non-tender, well supported, no discharge  Vagina:  Well-estrogenized, white thin discharge, no lesions  Cervix: no lesions, white discharge  Uterus:  anteverted, smooth contour, without enlargement, mobile, and without tenderness  Ovaries:  No masses appreciated, non-tender, mobile  Rectal Exam: deferred  Musculoskeletal: extremities normal    In-Clinic Test Results:  No results found for this or any previous visit (from the past 24 hour(s)).    ASSESSMENT/PLAN:                                                        ICD-10-CM    1. Vaginal bleeding N93.9 Beta HCG qual IFA urine - [facility] and [place]     NEISSERIA GONORRHOEA PCR     CHLAMYDIA TRACHOMATIS PCR     Wet  Prep     US Pelvic Complete with Transvaginal "   2. Vaginal discharge N89.8      Urine pregnancy test negative -- patient will be notified by MA.   Wet prep and Ludwig/Ch obtained.   If above testing negative, patient will schedule pelvic ultrasound for evaluation of pelvic anatomy.     Has previously tried Mirena IUD (out after one month) and oral contraceptive pills (OCPs). Would like to continue Depo Provera.     Follow up in office with me if above testing negative and no improvement in next 1-2 months.     Rubina Jones,   [facility]

## 2018-06-20 NOTE — MR AVS SNAPSHOT
After Visit Summary   6/20/2018    Saritha Ferrera    MRN: [id number]           Patient Information     Date Of Birth          2000        Visit Information        Provider Department      6/20/2018 2:30 PM Rubina Jones DO [facility]        Today's Diagnoses     Vaginal bleeding    -  1    Vaginal discharge           Follow-ups after your visit        Follow-up notes from your care team     Return if symptoms worsen or fail to improve.      Your next 10 appointments already scheduled     Jun 27, 2018  2:30 PM CDT   Nurse Only with [facility] - NURSE   [facility] ([facility]    [address] [id number] [phone]              Future tests that were ordered for you today     Open Future Orders        Priority Expected Expires Ordered    US Pelvic Complete with Transvaginal Routine  6/20/2019 6/20/2018            Who to contact     If you have questions or need follow up information about today's clinic visit or your schedule please contact [facility] directly at [phone].  Normal or non-critical lab and imaging results will be communicated to you by MyChart, letter or phone within 4 business days after the clinic has received the results. If you do not hear from us within 7 days, please contact the clinic through MyChart or phone. If you have a critical or abnormal lab result, we will notify you by phone as soon as possible.  Submit refill requests through [employer] or call your pharmacy and they will forward the refill request to us. Please allow 3 business days for your refill to be completed.          Additional Information About Your Visit        Care EveryWhere ID     This is your Care EveryWhere ID. This could be used by other organizations to access your [place] medical records  [id number]        Your Vitals Were     Height Last Period BMI (Body Mass  "Index)             5' 8.5\" (1.74 m) 05/29/2018 (Exact Date) 22.78 kg/m2          Blood Pressure from Last 3 Encounters:   06/20/18 100/70   06/06/18 92/60   04/03/18 110/60    Weight from Last 3 Encounters:   06/20/18 152 lb (68.9 kg)   06/06/18 155 lb (70.3 kg)   04/03/18 157 lb (71.2 kg)              We Performed the Following     Beta HCG qual IFA urine - [facility] and [place]     CHLAMYDIA TRACHOMATIS PCR     NEISSERIA GONORRHOEA PCR     Wet  Prep        Primary Care Provider Office Phone # Fax #    Sheron Meyer [phone]       [address] [facility] [address]        Equal Access to Services     JOSE LAMBERT : Hadii zurdo lowo Soomaali, waaxda luqadaha, qaybta kaalmada adeegyada, yaima jenkins . So [facility] [phone].    ATENCIÓN: Si habla español, tiene a day disposición servicios gratuitos de asistencia lingüística. [facility] [phone].    We comply with applicable federal civil rights laws and [place] laws. We do not discriminate on the basis of race, color, national origin, age, disability, sex, sexual orientation, or gender identity.            Thank you!     Thank you for choosing [facility]  for your care. Our goal is always to provide you with excellent care. Hearing back from our patients is one way we can continue to improve our services. Please take a few minutes to complete the written survey that you may receive in the mail after your visit with us. Thank you!             Your Updated Medication List - Protect others around you: Learn how to safely use, store and throw away your medicines at www.disposemymeds.org.          This list is accurate as of 6/20/18  3:16 PM.  Always use your most recent med list.                   Brand Name Dispense Instructions for use Diagnosis    ADVIL PO           buPROPion 150 MG 24 hr tablet    WELLBUTRIN XL    30 tablet    Take 1 tablet (150 mg) by mouth every morning    BRUNO (generalized " anxiety disorder)       calcium carbonate 500 MG chewable tablet    TUMS     Take 1 chew tab by mouth 2 times daily        medroxyPROGESTERone 150 MG/ML injection    DEPO-PROVERA    3 mL    Inject 1 mL (150 mg) into the muscle every 3 months    Unwanted fertility, Adjustment disorder with depressed mood       propranolol 60 MG 24 hr capsule    INDERAL LA    30 capsule    Take 1 capsule (60 mg) by mouth daily    BRUNO (generalized anxiety disorder)

## 2018-06-27 ENCOUNTER — ALLIED HEALTH/NURSE VISIT (OUTPATIENT)
Dept: NURSING | Facility: CLINIC | Age: 18
End: 2018-06-27
Payer: COMMERCIAL

## 2018-06-27 DIAGNOSIS — Z30.42 ENCOUNTER FOR SURVEILLANCE OF INJECTABLE CONTRACEPTIVE: Primary | ICD-10-CM

## 2018-06-27 PROCEDURE — 99207 ZZC RSCC CODE FOR CODING REVIEW: CPT

## 2018-06-27 PROCEDURE — 96372 THER/PROPH/DIAG INJ SC/IM: CPT

## 2018-08-14 ENCOUNTER — NURSE TRIAGE (OUTPATIENT)
Dept: NURSING | Facility: CLINIC | Age: 18
End: 2018-08-14

## 2018-08-14 NOTE — TELEPHONE ENCOUNTER
Saritha had a panic attack yesterday and today has questions on her medications that she is taking.  FNA advised to contact CR Jones with Psych/Mental her primary.

## 2018-08-20 ENCOUNTER — OFFICE VISIT (OUTPATIENT)
Dept: PEDIATRICS | Facility: CLINIC | Age: 18
End: 2018-08-20
Payer: COMMERCIAL

## 2018-08-20 VITALS
TEMPERATURE: 97.4 F | OXYGEN SATURATION: 98 % | WEIGHT: 153.3 LBS | SYSTOLIC BLOOD PRESSURE: 105 MMHG | BODY MASS INDEX: 22.97 KG/M2 | HEART RATE: 76 BPM | DIASTOLIC BLOOD PRESSURE: 69 MMHG

## 2018-08-20 DIAGNOSIS — F41.1 GAD (GENERALIZED ANXIETY DISORDER): ICD-10-CM

## 2018-08-20 DIAGNOSIS — F33.1 MAJOR DEPRESSIVE DISORDER, RECURRENT EPISODE, MODERATE (H): Primary | ICD-10-CM

## 2018-08-20 PROCEDURE — 99214 OFFICE O/P EST MOD 30 MIN: CPT | Performed by: PEDIATRICS

## 2018-08-20 RX ORDER — BUPROPION HYDROCHLORIDE 300 MG/1
300 TABLET ORAL EVERY MORNING
Qty: 30 TABLET | Refills: 0 | Status: SHIPPED | OUTPATIENT
Start: 2018-08-20 | End: 2018-09-24

## 2018-08-20 ASSESSMENT — ANXIETY QUESTIONNAIRES
1. FEELING NERVOUS, ANXIOUS, OR ON EDGE: MORE THAN HALF THE DAYS
7. FEELING AFRAID AS IF SOMETHING AWFUL MIGHT HAPPEN: NOT AT ALL
5. BEING SO RESTLESS THAT IT IS HARD TO SIT STILL: NOT AT ALL
GAD7 TOTAL SCORE: 10
2. NOT BEING ABLE TO STOP OR CONTROL WORRYING: SEVERAL DAYS
7. FEELING AFRAID AS IF SOMETHING AWFUL MIGHT HAPPEN: NOT AT ALL
4. TROUBLE RELAXING: MORE THAN HALF THE DAYS
3. WORRYING TOO MUCH ABOUT DIFFERENT THINGS: MORE THAN HALF THE DAYS
6. BECOMING EASILY ANNOYED OR IRRITABLE: NEARLY EVERY DAY
GAD7 TOTAL SCORE: 10
GAD7 TOTAL SCORE: 10

## 2018-08-20 ASSESSMENT — PATIENT HEALTH QUESTIONNAIRE - PHQ9
10. IF YOU CHECKED OFF ANY PROBLEMS, HOW DIFFICULT HAVE THESE PROBLEMS MADE IT FOR YOU TO DO YOUR WORK, TAKE CARE OF THINGS AT HOME, OR GET ALONG WITH OTHER PEOPLE: SOMEWHAT DIFFICULT
SUM OF ALL RESPONSES TO PHQ QUESTIONS 1-9: 13
SUM OF ALL RESPONSES TO PHQ QUESTIONS 1-9: 13

## 2018-08-20 NOTE — PATIENT INSTRUCTIONS
Take 2 of the 150 mg Wellbutrin (Bupropion) until you run out.  Then take the new rx which is already 300 mg.

## 2018-08-20 NOTE — MR AVS SNAPSHOT
After Visit Summary   8/20/2018    Saritha Ferrera    MRN: [id number]           Patient Information     Date Of Birth          2000        Visit Information        Provider Department      8/20/2018 3:20 PM Sheron Meyer MD [facility]        Today's Diagnoses     Major depressive disorder, recurrent episode, moderate (H)    -  1    BRUNO (generalized anxiety disorder)          Care Instructions    Take 2 of the 150 mg Wellbutrin (Bupropion) until you run out.  Then take the new rx which is already 300 mg.          Follow-ups after your visit        Follow-up notes from your care team     Return in about 1 month (around 9/20/2018).      Your next 10 appointments already scheduled     Sep 19, 2018  2:30 PM CDT   Nurse Only with [facility] PEDIATRICS - NURSE   [facility]    [address] [address] [phone]              Who to contact     If you have questions or need follow up information about today's clinic visit or your schedule please contact [facility] directly at [phone].  Normal or non-critical lab and imaging results will be communicated to you by MyChart, letter or phone within 4 business days after the clinic has received the results. If you do not hear from us within 7 days, please contact the clinic through MyChart or phone. If you have a critical or abnormal lab result, we will notify you by phone as soon as possible.  Submit refill requests through GameMixhart or call your pharmacy and they will forward the refill request to us. Please allow 3 business days for your refill to be completed.          Additional Information About Your Visit        Care EveryWhere ID     This is your Care EveryWhere ID. This could be used by other organizations to access your [place] medical records  [id number]        Your Vitals Were     Pulse Temperature Last Period  Pulse Oximetry BMI (Body Mass Index)       76 97.4  F (36.3  C) (Oral) 08/15/2018 (Approximate) 98% 22.97 kg/m2        Blood Pressure from Last 3 Encounters:   08/20/18 105/69   06/20/18 100/70   06/06/18 92/60    Weight from Last 3 Encounters:   08/20/18 153 lb 4.8 oz (69.5 kg)   06/20/18 152 lb (68.9 kg)   06/06/18 155 lb (70.3 kg)              Today, you had the following     No orders found for display         Today's Medication Changes          These changes are accurate as of 8/20/18  4:12 PM.  If you have any questions, ask your nurse or doctor.               These medicines have changed or have updated prescriptions.        Dose/Directions    buPROPion 300 MG 24 hr tablet   Commonly known as:  WELLBUTRIN XL   This may have changed:    - medication strength  - how much to take   Used for:  Major depressive disorder, recurrent episode, moderate (H), BRUNO (generalized anxiety disorder)   Changed by:  Sheron Meyer MD        Dose:  300 mg   Take 1 tablet (300 mg) by mouth every morning   Quantity:  30 tablet   Refills:  0            Where to get your medicines      These medications were sent to [facility] [address] [place] [address] [address] AT [place] [address] [address] [facility] [address]     Phone:  [phone]     buPROPion 300 MG 24 hr tablet                Primary Care Provider Office Phone # Fax #    Sheron Meyer [phone] [address] [place] [address]        Equal Access to Services     AURELIA LAMBERT AH: Hadii zurdo snyder hadasho Soomaali, waaxda luqadaha, qaybta kaalmada adeegyasunil, yaima awan. So [facility] [phone].    ATENCIÓN: Si habla español, tiene a day disposición servicios gratuitos de asistencia lingüística. Llame al [phone].    We comply with applicable federal civil rights laws and [place] laws. We do not discriminate on the basis of race, color, national origin, age, disability, sex, sexual  orientation, or gender identity.            Thank you!     Thank you for choosing [facility]  for your care. Our goal is always to provide you with excellent care. Hearing back from our patients is one way we can continue to improve our services. Please take a few minutes to complete the written survey that you may receive in the mail after your visit with us. Thank you!             Your Updated Medication List - Protect others around you: Learn how to safely use, store and throw away your medicines at www.disposemymeds.org.          This list is accurate as of 8/20/18  4:12 PM.  Always use your most recent med list.                   Brand Name Dispense Instructions for use Diagnosis    ADVIL PO           buPROPion 300 MG 24 hr tablet    WELLBUTRIN XL    30 tablet    Take 1 tablet (300 mg) by mouth every morning    Major depressive disorder, recurrent episode, moderate (H), BRUNO (generalized anxiety disorder)       calcium carbonate 500 MG chewable tablet    TUMS     Take 1 chew tab by mouth 2 times daily        medroxyPROGESTERone 150 MG/ML injection    DEPO-PROVERA    3 mL    Inject 1 mL (150 mg) into the muscle every 3 months    Unwanted fertility, Adjustment disorder with depressed mood       propranolol 60 MG 24 hr capsule    INDERAL LA    30 capsule    Take 1 capsule (60 mg) by mouth daily    BRUNO (generalized anxiety disorder)

## 2018-08-20 NOTE — PROGRESS NOTES
SUBJECTIVE:   Saritha Ferrera is a 18 year old female who presents to clinic today with Self   because of:    Chief Complaint   Patient presents with     Anxiety        HPI  Concerns: Patient is here for panic attacks.     Answers for HPI/ROS submitted by the patient on 8/20/2018   If you checked off any problems, how difficult have these problems made it for you to do your work, take care of things at home, or get along with other people?: Somewhat difficult  PHQ9 TOTAL SCORE: 13  BRUNO 7 TOTAL SCORE: 10  PHQ-9 SCORE 4/3/2018 6/6/2018 8/20/2018   Total Score - - -   Total Score MyChart - 4 (Minimal depression) 13 (Moderate depression)   Total Score 21 4 13     BRUNO-7 SCORE 4/3/2018 6/6/2018 8/20/2018   Total Score - - -   Total Score - 5 (mild anxiety) 10 (moderate anxiety)   Total Score 12 5 10         Saritha has a long history of anxiety, depression, daily marijuana use and tobacco use.  Please see psychiatry note from June 2018 for complete summary of her psychiatric history.  Recently, she tried to stop smoking marijuana cold turkey and had a panic attack.  The police were called and all the next day she was touchy and irritable.  She is wondering if her medications should be adjusted.  She is planning to cut down her marijuana use slowly now.  She is not interested in drug treatment.  Denies any suicidal thoughts or ideation.  Readily contracted for safety with me today.          ROS  Constitutional, eye, ENT, skin, respiratory, cardiac, and GI are normal except as otherwise noted.    PROBLEM LIST  Patient Active Problem List    Diagnosis Date Noted     Major depressive disorder, recurrent episode, moderate (H) 06/06/2018     Priority: Medium     BRUNO (generalized anxiety disorder) 06/06/2018     Priority: Medium     Cannabis abuse, daily use 06/06/2018     Priority: Medium     Eating disorder 06/06/2018     Priority: Medium     Migraine with aura 04/10/2015     Priority: Medium     Contraception management  05/07/2014     Priority: Medium     Adjustment disorder with depressed mood 11/04/2013     Priority: Medium      MEDICATIONS  Current Outpatient Prescriptions   Medication Sig Dispense Refill     buPROPion (WELLBUTRIN XL) 150 MG 24 hr tablet Take 1 tablet (150 mg) by mouth every morning 30 tablet 3     calcium carbonate (TUMS) 500 MG chewable tablet Take 1 chew tab by mouth 2 times daily       Ibuprofen (ADVIL PO)        medroxyPROGESTERone (DEPO-PROVERA) 150 MG/ML injection Inject 1 mL (150 mg) into the muscle every 3 months 3 mL 3     propranolol (INDERAL LA) 60 MG 24 hr capsule Take 1 capsule (60 mg) by mouth daily 30 capsule 3      ALLERGIES  Allergies   Allergen Reactions     No Known Drug Allergies        Reviewed and updated as needed this visit by clinical staff  Tobacco  Allergies  Meds  Problems  Soc Hx        Reviewed and updated as needed this visit by Provider  Allergies  Meds  Problems       OBJECTIVE:     /69  Pulse 76  Temp 97.4  F (36.3  C) (Oral)  Wt 153 lb 4.8 oz (69.5 kg)  LMP 08/15/2018 (Approximate)  SpO2 98%  BMI 22.97 kg/m2    Wt Readings from Last 4 Encounters:   08/20/18 153 lb 4.8 oz (69.5 kg)   06/20/18 152 lb (68.9 kg)   06/06/18 155 lb (70.3 kg)   04/03/18 157 lb (71.2 kg)       GENERAL:  Alert and interactive., EYES:  Normal extra-ocular movements.  PERRLA, LUNGS:  Clear, HEART:  Normal rate and rhythm.  Normal S1 and S2.  No murmurs. and NEURO:  No tics or tremor.  Normal tone and strength. Normal gait and balance.     DIAGNOSTICS: None    ASSESSMENT/PLAN:   1. Major depressive disorder, recurrent episode, moderate (H)  2. BRUNO (generalized anxiety disorder)  Increase dose from 150 to 300  - buPROPion (WELLBUTRIN XL) 300 MG 24 hr tablet; Take 1 tablet (300 mg) by mouth every morning  Dispense: 30 tablet; Refill: 0  Patient education provided, including expected course of illness and symptoms that may occur which would require urgent evalution.       FOLLOW UP: in  1  month or earlier prn.  Could consider increasing Inderall dose at that time if irritable with higher dose Bupropion.     Sheron Meyer MD

## 2018-08-21 ASSESSMENT — ANXIETY QUESTIONNAIRES: GAD7 TOTAL SCORE: 10

## 2018-08-21 ASSESSMENT — PATIENT HEALTH QUESTIONNAIRE - PHQ9: SUM OF ALL RESPONSES TO PHQ QUESTIONS 1-9: 13

## 2018-09-19 ENCOUNTER — ALLIED HEALTH/NURSE VISIT (OUTPATIENT)
Dept: NURSING | Facility: CLINIC | Age: 18
End: 2018-09-19
Payer: COMMERCIAL

## 2018-09-19 PROCEDURE — 96372 THER/PROPH/DIAG INJ SC/IM: CPT

## 2018-09-24 DIAGNOSIS — F41.1 GAD (GENERALIZED ANXIETY DISORDER): ICD-10-CM

## 2018-09-24 DIAGNOSIS — F33.1 MAJOR DEPRESSIVE DISORDER, RECURRENT EPISODE, MODERATE (H): ICD-10-CM

## 2018-09-24 RX ORDER — PROPRANOLOL HCL 60 MG
60 CAPSULE, EXTENDED RELEASE 24HR ORAL DAILY
Qty: 30 CAPSULE | Refills: 3 | Status: SHIPPED | OUTPATIENT
Start: 2018-09-24 | End: 2018-10-08

## 2018-09-24 RX ORDER — PROPRANOLOL HCL 60 MG
CAPSULE, EXTENDED RELEASE 24HR ORAL
Qty: 30 CAPSULE | Refills: 0 | Status: SHIPPED | OUTPATIENT
Start: 2018-09-24 | End: 2018-10-08

## 2018-09-24 NOTE — TELEPHONE ENCOUNTER
Rx written as requested, pharmacy to notify patient.    Electronically signed by:  Sheron Meyer MD  Pediatrics  [facility]

## 2018-09-24 NOTE — TELEPHONE ENCOUNTER
"Requested Prescriptions   Pending Prescriptions Disp Refills     buPROPion (WELLBUTRIN XL) 300 MG 24 hr tablet [Pharmacy Med Name: BUPROPION XL 300MG TABLETS] 30 tablet 0     Sig: TAKE 1 TABLET(300 MG) BY MOUTH EVERY MORNING    SSRIs Protocol Passed    9/24/2018 11:33 AM       Passed - PHQ-9 score less than 5 in past 6 months    Please review last PHQ-9 score.          Passed - Medication is Bupropion    If the medication is Bupropion (Wellbutrin), and the patient is taking for smoking cessation; OK to refill.         Passed - Patient is age 18 or older       Passed - No active pregnancy on record       Passed - No positive pregnancy test in last 12 months       Passed - Recent (6 mo) or future (30 days) visit within the authorizing provider's specialty    Patient had office visit in the last 6 months or has a visit in the next 30 days with authorizing provider or within the authorizing provider's specialty.  See \"Patient Info\" tab in inbasket, or \"Choose Columns\" in Meds & Orders section of the refill encounter.            Last Written Prescription Date:  8/20/18  Last Fill Quantity: 30,  # refills: 0   Last office visit: 8/20/2018 with prescribing provider:  8/20/18   Future Office Visit:   Next 5 appointments (look out 90 days)     Dec 10, 2018  2:30 PM CST   Nurse Only with [facility]ATRICS - NURSE   [facility]    [address] [id number] [phone]                   "

## 2018-09-24 NOTE — TELEPHONE ENCOUNTER
Patient was returned to referring provider for ongoing medication management after appointment on 06.06.2018.  Will forward to PCP to review and address refill.

## 2018-09-25 ENCOUNTER — TELEPHONE (OUTPATIENT)
Dept: PEDIATRICS | Facility: CLINIC | Age: 18
End: 2018-09-25

## 2018-09-25 RX ORDER — BUPROPION HYDROCHLORIDE 300 MG/1
TABLET ORAL
Qty: 30 TABLET | Refills: 0 | Status: SHIPPED | OUTPATIENT
Start: 2018-09-25 | End: 2018-10-08

## 2018-09-25 NOTE — TELEPHONE ENCOUNTER
Patient called today.    Patient scheduled an appointment on October 8, 2018 with Sheron Meyer MD at [facility].    Patient is out of her medications on file.    Can patient get medications until appointment?    Please contact patient.    Thank you.    Central Scheduling  Paula WILKS

## 2018-09-25 NOTE — TELEPHONE ENCOUNTER
Today - rx for buPROPion (WELLBUTRIN XL) 300 MG 24 hr tablet approved for 30 days     Yesterday - rx for propranolol (INDERAL LA) 60 MG 24 hr capsule approved for 30 days

## 2018-10-08 ENCOUNTER — OFFICE VISIT (OUTPATIENT)
Dept: PEDIATRICS | Facility: CLINIC | Age: 18
End: 2018-10-08
Payer: COMMERCIAL

## 2018-10-08 VITALS
OXYGEN SATURATION: 100 % | SYSTOLIC BLOOD PRESSURE: 107 MMHG | DIASTOLIC BLOOD PRESSURE: 69 MMHG | BODY MASS INDEX: 22.09 KG/M2 | HEART RATE: 61 BPM | TEMPERATURE: 97.5 F | WEIGHT: 147.4 LBS

## 2018-10-08 DIAGNOSIS — F12.10 CANNABIS ABUSE, DAILY USE: ICD-10-CM

## 2018-10-08 DIAGNOSIS — F41.1 GAD (GENERALIZED ANXIETY DISORDER): ICD-10-CM

## 2018-10-08 DIAGNOSIS — F33.1 MAJOR DEPRESSIVE DISORDER, RECURRENT EPISODE, MODERATE (H): Primary | ICD-10-CM

## 2018-10-08 PROCEDURE — 99214 OFFICE O/P EST MOD 30 MIN: CPT | Performed by: PEDIATRICS

## 2018-10-08 RX ORDER — PROPRANOLOL HCL 60 MG
60 CAPSULE, EXTENDED RELEASE 24HR ORAL DAILY
Qty: 90 CAPSULE | Refills: 1 | Status: SHIPPED | OUTPATIENT
Start: 2018-10-08 | End: 2019-02-19

## 2018-10-08 RX ORDER — BUPROPION HYDROCHLORIDE 300 MG/1
300 TABLET ORAL EVERY MORNING
Qty: 90 TABLET | Refills: 1 | Status: SHIPPED | OUTPATIENT
Start: 2018-10-08 | End: 2019-02-19

## 2018-10-08 RX ORDER — PROPRANOLOL HCL 60 MG
CAPSULE, EXTENDED RELEASE 24HR ORAL
Qty: 30 CAPSULE | Refills: 0 | COMMUNITY
Start: 2018-10-08 | End: 2018-10-08

## 2018-10-08 ASSESSMENT — ANXIETY QUESTIONNAIRES
7. FEELING AFRAID AS IF SOMETHING AWFUL MIGHT HAPPEN: NOT AT ALL
5. BEING SO RESTLESS THAT IT IS HARD TO SIT STILL: SEVERAL DAYS
GAD7 TOTAL SCORE: 8
IF YOU CHECKED OFF ANY PROBLEMS ON THIS QUESTIONNAIRE, HOW DIFFICULT HAVE THESE PROBLEMS MADE IT FOR YOU TO DO YOUR WORK, TAKE CARE OF THINGS AT HOME, OR GET ALONG WITH OTHER PEOPLE: NOT DIFFICULT AT ALL
6. BECOMING EASILY ANNOYED OR IRRITABLE: MORE THAN HALF THE DAYS
2. NOT BEING ABLE TO STOP OR CONTROL WORRYING: SEVERAL DAYS
3. WORRYING TOO MUCH ABOUT DIFFERENT THINGS: SEVERAL DAYS
1. FEELING NERVOUS, ANXIOUS, OR ON EDGE: SEVERAL DAYS

## 2018-10-08 ASSESSMENT — PATIENT HEALTH QUESTIONNAIRE - PHQ9: 5. POOR APPETITE OR OVEREATING: MORE THAN HALF THE DAYS

## 2018-10-08 NOTE — MR AVS SNAPSHOT
After Visit Summary   10/8/2018    Saritha Ferrera    MRN: [id number]           Patient Information     Date Of Birth          2000        Visit Information        Provider Department      10/8/2018 3:00 PM Sheron Meyer MD [facility]        Today's Diagnoses     Major depressive disorder, recurrent episode, moderate (H)    -  1    BRUNO (generalized anxiety disorder)        Cannabis abuse, daily use          Care Instructions    Dr. Radhika Ferguson,   Brigid Gray, HEIKE Ordoñez          Follow-ups after your visit        Follow-up notes from your care team     Return in about 6 months (around 4/8/2019) for Medication Recheck - internal medicine.      Your next 10 appointments already scheduled     Dec 10, 2018  2:30 PM CST   Nurse Only with [facility] PEDIATRICS - NURSE   [facility]    [address] [address] [phone]              Who to contact     If you have questions or need follow up information about today's clinic visit or your schedule please contact [facility] directly at [phone].  Normal or non-critical lab and imaging results will be communicated to you by MyChart, letter or phone within 4 business days after the clinic has received the results. If you do not hear from us within 7 days, please contact the clinic through JW Playerhart or phone. If you have a critical or abnormal lab result, we will notify you by phone as soon as possible.  Submit refill requests through SensorTran or call your pharmacy and they will forward the refill request to us. Please allow 3 business days for your refill to be completed.          Additional Information About Your Visit        MyChart Information     SensorTran lets you send messages to your doctor, view your test results, renew your prescriptions, schedule appointments and more. To sign up, go to  [email or web address] . Click on \"Log in\" on the left side of the screen, which will take you to the Welcome page. Then click on \"Sign up Now\" on the right side of the page.     You will be asked to enter the access code listed below, as well as some personal information. Please follow the directions to create your username and password.     Your access code is: [email or web address]  Expires: 2018  3:13 PM     Your access code will  in 90 days. If you need help or a new code, please call your [place] clinic or [phone].        Care EveryWhere ID     This is your Care EveryWhere ID. This could be used by other organizations to access your [place] medical records  [id number]        Your Vitals Were     Pulse Temperature Last Period Pulse Oximetry BMI (Body Mass Index)       61 97.5  F (36.4  C) (Oral) 10/06/2018 100% 22.09 kg/m2        Blood Pressure from Last 3 Encounters:   10/08/18 107/69   18 105/69   18 100/70    Weight from Last 3 Encounters:   10/08/18 147 lb 6.4 oz (66.9 kg)   18 153 lb 4.8 oz (69.5 kg)   18 152 lb (68.9 kg)              Today, you had the following     No orders found for display         Today's Medication Changes          These changes are accurate as of 10/8/18  3:42 PM.  If you have any questions, ask your nurse or doctor.               These medicines have changed or have updated prescriptions.        Dose/Directions    buPROPion 300 MG 24 hr tablet   Commonly known as:  WELLBUTRIN XL   This may have changed:  See the new instructions.   Used for:  Major depressive disorder, recurrent episode, moderate (H), BRUNO (generalized anxiety disorder)   Changed by:  Sheron Meyer MD        Dose:  300 mg   Take 1 tablet (300 mg) by mouth every morning   Quantity:  90 tablet   Refills:  1       propranolol 60 MG 24 hr capsule   Commonly known as:  INDERAL LA   This may have changed:  Another medication with the same name was removed. Continue taking this " medication, and follow the directions you see here.   Used for:  BRUNO (generalized anxiety disorder)   Changed by:  Sheron Meyer MD        Dose:  60 mg   Take 1 capsule (60 mg) by mouth daily   Quantity:  90 capsule   Refills:  1            Where to get your medicines      These medications were sent to [id number] [address] AT [facility] [address], [facility] [address]     Phone:  [phone]     buPROPion 300 MG 24 hr tablet    propranolol 60 MG 24 hr capsule                Primary Care Provider Office Phone # Fax #    Sheron Meyer [phone]       [address]  [facility] [address]        Equal Access to Services     JOSE LAMBERT : Hadii zurdo snyder hadasho Soomaali, waaxda luqadaha, qaybta kaalmada adeegyada, yaima sweeneyin tricia jenkins . So [facility] [phone].    ATENCIÓN: Si habla español, tiene a day disposición servicios gratuitos de asistencia lingüística. [facility] [phone].    We comply with applicable federal civil rights laws and [place] laws. We do not discriminate on the basis of race, color, national origin, age, disability, sex, sexual orientation, or gender identity.            Thank you!     Thank you for choosing [facility]  for your care. Our goal is always to provide you with excellent care. Hearing back from our patients is one way we can continue to improve our services. Please take a few minutes to complete the written survey that you may receive in the mail after your visit with us. Thank you!             Your Updated Medication List - Protect others around you: Learn how to safely use, store and throw away your medicines at www.disposemymeds.org.          This list is accurate as of 10/8/18  3:42 PM.  Always use your most recent med list.                   Brand Name Dispense Instructions for use Diagnosis    ADVIL PO           buPROPion 300 MG 24 hr tablet    WELLBUTRIN  XL    90 tablet    Take 1 tablet (300 mg) by mouth every morning    Major depressive disorder, recurrent episode, moderate (H), BRUNO (generalized anxiety disorder)       calcium carbonate 500 MG chewable tablet    TUMS     Take 1 chew tab by mouth 2 times daily        medroxyPROGESTERone 150 MG/ML injection    DEPO-PROVERA    3 mL    Inject 1 mL (150 mg) into the muscle every 3 months    Unwanted fertility, Adjustment disorder with depressed mood       propranolol 60 MG 24 hr capsule    INDERAL LA    90 capsule    Take 1 capsule (60 mg) by mouth daily    BRUNO (generalized anxiety disorder)

## 2018-10-08 NOTE — PROGRESS NOTES
"SUBJECTIVE:   Saritha Ferrera is a 18 year old female who presents to clinic today with self  because of: follow up     Chief Complaint   Patient presents with     RECHECK     Follow up from Last visit        HPI  General Follow Up  About the medication  Concern: none    Progression of symptoms: better  Description: feels good.    We increased her Wellbutrin XL from 150 to 300mg 2 months ago. She is also taking propranolol.  She reports a little trouble falling asleep, and some decreased appetite, but otherwise no side effects.  She is happy with the treatment.  She continues to use cannabis on a daily basis, despite attempts to cut down.  She denies other drug use.  Has a stable home, works.  Boyfriend also using marijuana daily.  Denies suicidal thoughts.    Please see summary of her psychiatric medications below.  This is copied from her psychiatry visit on 6/6/18.  She does not attend therapy  Effexor (venlafaxine)   Buspar (buspirone)   Prozac (fluoxetine) stopped by patient because \"irritable\"  Zoloft (sertraline) \"worked for awhile and it was making her paranoid\"  Neurontin (gabapentin) from mother for headaches- wanted to use this a lot so just stopped it in 2015  Wellbutrin (buproprion)   Inderal (propranolol)     Nervous about transition to adult care.  Would like to stay on this medication regimen.          ROS  Constitutional, eye, ENT, skin, respiratory, cardiac, and GI are normal except as otherwise noted.    PROBLEM LIST  Patient Active Problem List    Diagnosis Date Noted     Major depressive disorder, recurrent episode, moderate (H) 06/06/2018     Priority: Medium     BRUNO (generalized anxiety disorder) 06/06/2018     Priority: Medium     Cannabis abuse, daily use 06/06/2018     Priority: Medium     Eating disorder 06/06/2018     Priority: Medium     Migraine with aura 04/10/2015     Priority: Medium     Contraception management 05/07/2014     Priority: Medium     Adjustment disorder with depressed " mood 11/04/2013     Priority: Medium      MEDICATIONS  Current Outpatient Prescriptions   Medication Sig Dispense Refill     buPROPion (WELLBUTRIN XL) 300 MG 24 hr tablet TAKE 1 TABLET(300 MG) BY MOUTH EVERY MORNING 30 tablet 0     propranolol (INDERAL LA) 60 MG 24 hr capsule Take 1 capsule (60 mg) by mouth daily 30 capsule 3     calcium carbonate (TUMS) 500 MG chewable tablet Take 1 chew tab by mouth 2 times daily       Ibuprofen (ADVIL PO)        medroxyPROGESTERone (DEPO-PROVERA) 150 MG/ML injection Inject 1 mL (150 mg) into the muscle every 3 months 3 mL 3     [DISCONTINUED] propranolol (INDERAL LA) 60 MG 24 hr capsule TAKE 1 CAPSULE(60 MG) BY MOUTH DAILY (Patient not taking: Reported on 10/8/2018) 30 capsule 0      ALLERGIES  Allergies   Allergen Reactions     No Known Drug Allergies        Reviewed and updated as needed this visit by clinical staff  Tobacco  Allergies  Meds  Problems  Soc Hx        Reviewed and updated as needed this visit by Provider  Allergies  Meds  Problems       OBJECTIVE:     /69 (BP Location: Right arm, Patient Position: Chair)  Pulse 61  Temp 97.5  F (36.4  C) (Oral)  Wt 147 lb 6.4 oz (66.9 kg)  LMP 10/06/2018  SpO2 100%  BMI 22.09 kg/m2  Normalized stature-for-age data not available for patients older than 20 years.  Normalized weight-for-age data not available for patients older than 20 years.  Normalized BMI data available only for age 0 to 20 years.  Normalized stature-for-age data not available for patients older than 20 years.    GENERAL:  Alert and interactive., EYES:  Normal extra-ocular movements.  PERRLA, LUNGS:  Clear, HEART:  Normal rate and rhythm.  Normal S1 and S2.  No murmurs. and NEURO:  No tics or tremor.  Normal tone and strength. Normal gait and balance.     DIAGNOSTICS: BRUNO 7 = 8  PHQ -9A = 10  BRUNO-7 SCORE 4/3/2018 6/6/2018 8/20/2018   Total Score - - -   Total Score - 5 (mild anxiety) 10 (moderate anxiety)   Total Score 12 5 10       PHQ-9 SCORE  4/3/2018 6/6/2018 8/20/2018   Total Score - - -   Total Score MyChart - 4 (Minimal depression) 13 (Moderate depression)   Total Score 21 4 13     ASSESSMENT/PLAN:   1. Major depressive disorder, recurrent episode, moderate (H)  - buPROPion (WELLBUTRIN XL) 300 MG 24 hr tablet; Take 1 tablet (300 mg) by mouth every morning  Dispense: 90 tablet; Refill: 1    2. BRUNO (generalized anxiety disorder)  - buPROPion (WELLBUTRIN XL) 300 MG 24 hr tablet; Take 1 tablet (300 mg) by mouth every morning  Dispense: 90 tablet; Refill: 1  - propranolol (INDERAL LA) 60 MG 24 hr capsule; Take 1 capsule (60 mg) by mouth daily  Dispense: 90 capsule; Refill: 1    3. Cannabis abuse, daily use  Encouraged treatment.  Patient declined.      FOLLOW UP: Return in about 6 months (around 4/8/2019) for Medication Recheck - internal medicine.    Sheron Meyer MD

## 2018-10-09 ASSESSMENT — ANXIETY QUESTIONNAIRES: GAD7 TOTAL SCORE: 8

## 2018-12-11 ENCOUNTER — OFFICE VISIT (OUTPATIENT)
Dept: OBGYN | Facility: CLINIC | Age: 18
End: 2018-12-11
Payer: COMMERCIAL

## 2018-12-11 VITALS
SYSTOLIC BLOOD PRESSURE: 102 MMHG | BODY MASS INDEX: 20.29 KG/M2 | DIASTOLIC BLOOD PRESSURE: 60 MMHG | HEIGHT: 69 IN | WEIGHT: 137 LBS

## 2018-12-11 DIAGNOSIS — Z30.015 ENCOUNTER FOR INITIAL PRESCRIPTION OF VAGINAL RING HORMONAL CONTRACEPTIVE: Primary | ICD-10-CM

## 2018-12-11 DIAGNOSIS — Z11.3 SCREEN FOR STD (SEXUALLY TRANSMITTED DISEASE): ICD-10-CM

## 2018-12-11 PROCEDURE — 99214 OFFICE O/P EST MOD 30 MIN: CPT | Performed by: OBSTETRICS & GYNECOLOGY

## 2018-12-11 PROCEDURE — 87591 N.GONORRHOEAE DNA AMP PROB: CPT | Performed by: OBSTETRICS & GYNECOLOGY

## 2018-12-11 PROCEDURE — 87389 HIV-1 AG W/HIV-1&-2 AB AG IA: CPT | Performed by: OBSTETRICS & GYNECOLOGY

## 2018-12-11 PROCEDURE — 36415 COLL VENOUS BLD VENIPUNCTURE: CPT | Performed by: OBSTETRICS & GYNECOLOGY

## 2018-12-11 PROCEDURE — 87340 HEPATITIS B SURFACE AG IA: CPT | Performed by: OBSTETRICS & GYNECOLOGY

## 2018-12-11 PROCEDURE — 86780 TREPONEMA PALLIDUM: CPT | Performed by: OBSTETRICS & GYNECOLOGY

## 2018-12-11 PROCEDURE — 86803 HEPATITIS C AB TEST: CPT | Performed by: OBSTETRICS & GYNECOLOGY

## 2018-12-11 PROCEDURE — 87491 CHLMYD TRACH DNA AMP PROBE: CPT | Performed by: OBSTETRICS & GYNECOLOGY

## 2018-12-11 RX ORDER — ETONOGESTREL AND ETHINYL ESTRADIOL VAGINAL RING .015; .12 MG/D; MG/D
1 RING VAGINAL
Qty: 3 EACH | Refills: 3 | Status: SHIPPED | OUTPATIENT
Start: 2018-12-11 | End: 2019-06-06

## 2018-12-11 ASSESSMENT — MIFFLIN-ST. JEOR: SCORE: 1447.87

## 2018-12-11 NOTE — NURSING NOTE
"Chief Complaint   Patient presents with     Consult     Consult to discuss periods and birth control and STD testing.        Initial /60 (BP Location: Left arm, Patient Position: Sitting, Cuff Size: Adult Regular)   Ht 1.74 m (5' 8.5\")   Wt 62.1 kg (137 lb)   LMP 2018 (Approximate)   Breastfeeding? No   BMI 20.53 kg/m   Estimated body mass index is 20.53 kg/m  as calculated from the following:    Height as of this encounter: 1.74 m (5' 8.5\").    Weight as of this encounter: 62.1 kg (137 lb).  BP completed using cuff size: regular    Questioned patient about current smoking habits.  Pt. currently smokes.  Advised about smoking cessation.               patient has appointment for today. Dipti Gold LPN                "

## 2018-12-11 NOTE — PROGRESS NOTES
SUBJECTIVE:                                                   Saritha Ferrera is a 18 year old female who presents to clinic today to follow up for irregular bleeding on Depo-Provera. Please see other notes for complete details.    She previously tried oral contraceptives, but could not remember to take them.  This was several years ago.  She also tried a Mirena IUD, but was not happy with that.  She is using Depo-Provera for birth control and also for cycle control, with a history of painful cramps with cycles.  She is interested in trying something else.  She would like to discuss options.      Problem list and histories reviewed & adjusted, as indicated.  Additional history: as documented.    Patient Active Problem List   Diagnosis     Adjustment disorder with depressed mood     Contraception management     Migraine with aura     Major depressive disorder, recurrent episode, moderate (H)     BRUNO (generalized anxiety disorder)     Cannabis abuse, daily use     Eating disorder     Past Surgical History:   Procedure Laterality Date     NO HISTORY OF SURGERY        Social History     Tobacco Use     Smoking status: Light Tobacco Smoker     Smokeless tobacco: Current User     Tobacco comment: Mom smokes outside. e cig, e cig one per day   Substance Use Topics     Alcohol use: No     Alcohol/week: 0.0 oz     Comment: 10/24/13 hf  04/10/2015 l.n. rareley socially 1/25/2017 l.n      Problem (# of Occurrences) Relation (Name,Age of Onset)    Alcohol/Drug (1) Sister    Other - See Comments (2) Mother: Anorexia, Sister: anger issues              Current Outpatient Medications on File Prior to Visit:  buPROPion (WELLBUTRIN XL) 300 MG 24 hr tablet Take 1 tablet (300 mg) by mouth every morning   calcium carbonate (TUMS) 500 MG chewable tablet Take 1 chew tab by mouth 2 times daily   Ibuprofen (ADVIL PO)    medroxyPROGESTERone (DEPO-PROVERA) 150 MG/ML injection Inject 1 mL (150 mg) into the muscle every 3 months   propranolol  (INDERAL LA) 60 MG 24 hr capsule Take 1 capsule (60 mg) by mouth daily     No current facility-administered medications on file prior to visit.   Allergies   Allergen Reactions     No Known Drug Allergies        ROS:  5 point ROS negative except as noted above in HPI, including Gen., Resp., CV, GI &  system review.    OBJECTIVE:     No exam was necessary today as this was entirely a counseling appointment.    In-Clinic Test Results:  No results found for this or any previous visit (from the past 24 hour(s)).    ASSESSMENT/PLAN:                                                        ICD-10-CM    1. Encounter for initial prescription of vaginal ring hormonal contraceptive Z30.015 etonogestrel-ethinyl estradiol (NUVARING) 0.12-0.015 MG/24HR vaginal ring   2. Screen for STD (sexually transmitted disease) Z11.3 HIV Antigen Antibody Combo     Hepatitis C antibody     Hepatitis B surface antigen     NEISSERIA GONORRHOEA PCR     CHLAMYDIA TRACHOMATIS PCR     Treponema Abs w Reflex to RPR and Titer         We discussed options to try.  She would like complete STD screen today.  We discussed the use of condoms in addition to anything else she is using for birth control, in order to prevent the transmission of sexually transmitted infections.    After discussion of all her options, she would like to try the NuvaRing.  Prescription for this was given, and I gave her written instructions in its use.  If she has any questions she is to contact me.  Likewise, if she is not happy with the NuvaRing and prefers to try birth control pill again, she will let me know.  She states that now that she is older and her schedule is much more regular, she thinks that she would be able to remember to take a pill.  However, she prefers to try the NuvaRing first.    Lindsey James MD  [facility]

## 2018-12-12 LAB
HBV SURFACE AG SERPL QL IA: NONREACTIVE
HCV AB SERPL QL IA: NONREACTIVE
HIV 1+2 AB+HIV1 P24 AG SERPL QL IA: NONREACTIVE
T PALLIDUM AB SER QL: NONREACTIVE

## 2018-12-13 ENCOUNTER — E-VISIT (OUTPATIENT)
Dept: OBGYN | Facility: CLINIC | Age: 18
End: 2018-12-13
Payer: COMMERCIAL

## 2018-12-13 DIAGNOSIS — N93.9 ABNORMAL UTERINE BLEEDING: Primary | ICD-10-CM

## 2018-12-13 PROCEDURE — 99444 ZZC PHYSICIAN ONLINE EVALUATION & MANAGEMENT SERVICE: CPT | Performed by: OBSTETRICS & GYNECOLOGY

## 2018-12-14 NOTE — PATIENT INSTRUCTIONS
Thank you for choosing us for your care. Based on your symptoms and length of illness, I do not think that you need a prescription at this time.  Please follow the care advise I ve provided and use the over the counter medications to help relieve your symptoms. View your full visit summary for details by clicking on the link below.     If you re not feeling better within 2-3 days, please respond to this message and we can consider if a prescription is needed.  You can schedule an appointment right here in [facility], or call [phone]  If the visit is for the same symptoms as your e-visit, we ll refund the cost of your e-visit if seen within seven days.    Thank you for choosing us for your care. Based on your symptoms and length of illness, I do not think that you need an antibiotic prescription at this time.  Please follow the care advise I've provided and use the prescribed medication to help relieve your symptoms. View your full visit summary for details by clicking on the link below.     If you're not feeling better within  2-3days, please respond to this message and we can consider if an antibiotic prescription is needed.  You can schedule an appointment right here in [facility], or call [phone]  If the visit is for the same symptoms as your e-visit, we'll refund the cost of your e-visit if seen within seven days   Irregular bleeding is quite common when starting a new birth control method. If this persists beyond 3 months, please make an appointment to follow up in clinic to rule out any other issues. This will typically resolve on its own.

## 2018-12-14 NOTE — TELEPHONE ENCOUNTER
Vaginal bleeding on the Nuvaring--new start.    Patient instructions regarding this being a common side effect, to follow up in clinic if persists beyond 3 months.    Lindsey James MD

## 2019-02-19 ENCOUNTER — MYC REFILL (OUTPATIENT)
Dept: PEDIATRICS | Facility: CLINIC | Age: 19
End: 2019-02-19

## 2019-02-19 DIAGNOSIS — F33.1 MAJOR DEPRESSIVE DISORDER, RECURRENT EPISODE, MODERATE (H): ICD-10-CM

## 2019-02-19 DIAGNOSIS — F41.1 GAD (GENERALIZED ANXIETY DISORDER): ICD-10-CM

## 2019-02-19 RX ORDER — PROPRANOLOL HCL 60 MG
60 CAPSULE, EXTENDED RELEASE 24HR ORAL DAILY
Qty: 90 CAPSULE | Refills: 1 | Status: SHIPPED | OUTPATIENT
Start: 2019-02-19 | End: 2021-01-20

## 2019-02-19 RX ORDER — BUPROPION HYDROCHLORIDE 300 MG/1
300 TABLET ORAL EVERY MORNING
Qty: 90 TABLET | Refills: 1 | Status: SHIPPED | OUTPATIENT
Start: 2019-02-19 | End: 2021-01-20

## 2019-02-19 NOTE — TELEPHONE ENCOUNTER
"Prescription approved per [facility] Refill Protocol.      Requested Prescriptions   Pending Prescriptions Disp Refills     buPROPion (WELLBUTRIN XL) 300 MG 24 hr tablet 90 tablet 1     Sig: Take 1 tablet (300 mg) by mouth every morning    SSRIs Protocol Failed - 2/19/2019 10:40 AM       Failed - PHQ-9 score less than 5 in past 6 months    Please review last PHQ-9 score.          Passed - Medication is Bupropion    If the medication is Bupropion (Wellbutrin), and the patient is taking for smoking cessation; OK to refill.         Passed - Medication is active on med list       Passed - Patient is age 18 or older       Passed - No active pregnancy on record       Passed - No positive pregnancy test in last 12 months       Passed - Recent (6 mo) or future (30 days) visit within the authorizing provider's specialty    Patient had office visit in the last 6 months or has a visit in the next 30 days with authorizing provider or within the authorizing provider's specialty.  See \"Patient Info\" tab in inbasket, or \"Choose Columns\" in Meds & Orders section of the refill encounter.            propranolol ER (INDERAL LA) 60 MG 24 hr capsule 90 capsule 1     Sig: Take 1 capsule (60 mg) by mouth daily    Beta-Blockers Protocol Passed - 2/19/2019 10:40 AM       Passed - Blood pressure under 140/90 in past 12 months    BP Readings from Last 3 Encounters:   12/11/18 102/60   10/08/18 107/69   08/20/18 105/69                Passed - Patient is age 6 or older       Passed - Recent (12 mo) or future (30 days) visit within the authorizing provider's specialty    Patient had office visit in the last 12 months or has a visit in the next 30 days with authorizing provider or within the authorizing provider's specialty.  See \"Patient Info\" tab in inbasket, or \"Choose Columns\" in Meds & Orders section of the refill encounter.             Passed - Medication is active on med list          "

## 2019-03-06 ENCOUNTER — OFFICE VISIT (OUTPATIENT)
Dept: INTERNAL MEDICINE | Facility: CLINIC | Age: 19
End: 2019-03-06
Payer: COMMERCIAL

## 2019-03-06 VITALS
WEIGHT: 136.2 LBS | SYSTOLIC BLOOD PRESSURE: 96 MMHG | HEART RATE: 56 BPM | OXYGEN SATURATION: 99 % | DIASTOLIC BLOOD PRESSURE: 60 MMHG | RESPIRATION RATE: 14 BRPM | BODY MASS INDEX: 20.41 KG/M2

## 2019-03-06 DIAGNOSIS — G43.109 MIGRAINE WITH AURA AND WITHOUT STATUS MIGRAINOSUS, NOT INTRACTABLE: ICD-10-CM

## 2019-03-06 DIAGNOSIS — F12.10 CANNABIS ABUSE, DAILY USE: ICD-10-CM

## 2019-03-06 DIAGNOSIS — F33.1 MAJOR DEPRESSIVE DISORDER, RECURRENT EPISODE, MODERATE (H): ICD-10-CM

## 2019-03-06 DIAGNOSIS — Z30.09 BIRTH CONTROL COUNSELING: Primary | ICD-10-CM

## 2019-03-06 DIAGNOSIS — Z78.9 ELECTRONIC CIGARETTE USE: ICD-10-CM

## 2019-03-06 DIAGNOSIS — Z30.013 ENCOUNTER FOR INITIAL PRESCRIPTION OF INJECTABLE CONTRACEPTIVE: ICD-10-CM

## 2019-03-06 DIAGNOSIS — F41.1 GAD (GENERALIZED ANXIETY DISORDER): ICD-10-CM

## 2019-03-06 LAB — HCG UR QL: NEGATIVE

## 2019-03-06 PROCEDURE — 96372 THER/PROPH/DIAG INJ SC/IM: CPT | Performed by: INTERNAL MEDICINE

## 2019-03-06 PROCEDURE — 81025 URINE PREGNANCY TEST: CPT | Performed by: INTERNAL MEDICINE

## 2019-03-06 PROCEDURE — 99214 OFFICE O/P EST MOD 30 MIN: CPT | Mod: 25 | Performed by: INTERNAL MEDICINE

## 2019-03-06 RX ORDER — MEDROXYPROGESTERONE ACETATE 150 MG/ML
150 INJECTION, SUSPENSION INTRAMUSCULAR
Status: DISCONTINUED | OUTPATIENT
Start: 2019-03-06 | End: 2021-03-25

## 2019-03-06 RX ADMIN — MEDROXYPROGESTERONE ACETATE 150 MG: 150 INJECTION, SUSPENSION INTRAMUSCULAR at 14:51

## 2019-03-06 ASSESSMENT — ANXIETY QUESTIONNAIRES
1. FEELING NERVOUS, ANXIOUS, OR ON EDGE: NEARLY EVERY DAY
2. NOT BEING ABLE TO STOP OR CONTROL WORRYING: SEVERAL DAYS
5. BEING SO RESTLESS THAT IT IS HARD TO SIT STILL: SEVERAL DAYS
IF YOU CHECKED OFF ANY PROBLEMS ON THIS QUESTIONNAIRE, HOW DIFFICULT HAVE THESE PROBLEMS MADE IT FOR YOU TO DO YOUR WORK, TAKE CARE OF THINGS AT HOME, OR GET ALONG WITH OTHER PEOPLE: SOMEWHAT DIFFICULT
6. BECOMING EASILY ANNOYED OR IRRITABLE: MORE THAN HALF THE DAYS
7. FEELING AFRAID AS IF SOMETHING AWFUL MIGHT HAPPEN: SEVERAL DAYS
GAD7 TOTAL SCORE: 12
3. WORRYING TOO MUCH ABOUT DIFFERENT THINGS: MORE THAN HALF THE DAYS

## 2019-03-06 ASSESSMENT — PATIENT HEALTH QUESTIONNAIRE - PHQ9
SUM OF ALL RESPONSES TO PHQ QUESTIONS 1-9: 13
5. POOR APPETITE OR OVEREATING: MORE THAN HALF THE DAYS

## 2019-03-06 NOTE — PATIENT INSTRUCTIONS
Recommend stopping Nuvaring (estrogen containing birth control not recommended in the setting of migraines with aura and smoking - even E-cigs and marijuana).    Watch mood with changing back to Depo.    If mood is still tenuous with change, please let me know.

## 2019-03-06 NOTE — PROGRESS NOTES
SUBJECTIVE:                                                      HPI: Saritha Ferrera is a pleasant 18 year old female who presents to discuss birth control and mood:    Patient currently uses NuvaRing for birth control.     Patient briefly tried oral birth control pills, but cannot remember to take them. She also tried a Mirena IUD, but did not like it. She was using Depo-Provera for birth control and cycle control but switch to NuvaRing in December. Switching to Nuvaring has resulted in more menstrual cramps and abnormal bleeding.     Of note, patient has a history of migraines with aura and smokes marijuana daily. She also uses e-cigarettes regularly, but not as often/as much as marijuana. I do not that she is a good candidate for estrogen-containing birth control. She is not interested in Nexplanon. As above, progestin-only pills are not a good option (patient cannot remember to take them) and patient is not interested in another IUD.    Patient reports that her mood has worsened also since switching to NuvaRing for birth control.  Prior to that her anxiety depression were well controlled with Wellbutrin and propranolol ER.    The medication, allergy, and problem lists have been reviewed and updated as appropriate.     OBJECTIVE:                                                      BP 96/60   Pulse 56   Resp 14   Wt 61.8 kg (136 lb 3.2 oz)   LMP 02/20/2019   SpO2 99%   BMI 20.41 kg/m    Constitutional: well-appearing  Psych: normal judgment and insight; normal mood and affect; recent and remote memory intact    ASSESSMENT/PLAN:                                                      (Z30.09) Birth control counseling  (primary encounter diagnosis)  (G43.109) Migraine with aura and without status migrainosus, not intractable  (F12.10) Cannabis abuse, daily use  (Z78.9) Electronic cigarette use  (Z30.013) Encounter for initial prescription of injectable contraceptive  Comment: not a good candidate for  estrogen-containing birth control.   Plan: STOP Nuvaring and RESTART Depo (urine pregnancy today).     (F33.1) Major depressive disorder, recurrent episode, moderate (H)  (F41.1) BRUNO (generalized anxiety disorder)  Comment: worsened since switching to Nuvaring.  Plan:    - CPM for now.   - monitor mood after switching back to Depo.   - if mood does not improve, patient to come back in/contact MD.    The instructions on the AVS were discussed and explained to the patient. Patient expressed understanding of instructions.    (Chart documentation was completed, in part, with [employer] voice-recognition software. Even though reviewed, some grammatical, spelling, and word errors may remain.)    Vita Ren MD   [address] [address]  T: [phone], F: [phone]

## 2019-03-07 ASSESSMENT — ANXIETY QUESTIONNAIRES: GAD7 TOTAL SCORE: 12

## 2019-06-06 ENCOUNTER — OFFICE VISIT (OUTPATIENT)
Dept: INTERNAL MEDICINE | Facility: CLINIC | Age: 19
End: 2019-06-06
Payer: COMMERCIAL

## 2019-06-06 VITALS
HEART RATE: 87 BPM | SYSTOLIC BLOOD PRESSURE: 96 MMHG | OXYGEN SATURATION: 98 % | RESPIRATION RATE: 16 BRPM | WEIGHT: 135.8 LBS | BODY MASS INDEX: 20.35 KG/M2 | DIASTOLIC BLOOD PRESSURE: 62 MMHG

## 2019-06-06 DIAGNOSIS — N89.8 VAGINAL DISCHARGE: Primary | ICD-10-CM

## 2019-06-06 DIAGNOSIS — Z30.42 ENCOUNTER FOR SURVEILLANCE OF INJECTABLE CONTRACEPTIVE: ICD-10-CM

## 2019-06-06 DIAGNOSIS — Z11.3 SCREEN FOR STD (SEXUALLY TRANSMITTED DISEASE): ICD-10-CM

## 2019-06-06 LAB
SPECIMEN SOURCE: NORMAL
WET PREP SPEC: NORMAL

## 2019-06-06 PROCEDURE — 87591 N.GONORRHOEAE DNA AMP PROB: CPT | Performed by: INTERNAL MEDICINE

## 2019-06-06 PROCEDURE — 87340 HEPATITIS B SURFACE AG IA: CPT | Performed by: INTERNAL MEDICINE

## 2019-06-06 PROCEDURE — 86803 HEPATITIS C AB TEST: CPT | Performed by: INTERNAL MEDICINE

## 2019-06-06 PROCEDURE — 87389 HIV-1 AG W/HIV-1&-2 AB AG IA: CPT | Performed by: INTERNAL MEDICINE

## 2019-06-06 PROCEDURE — 86780 TREPONEMA PALLIDUM: CPT | Performed by: INTERNAL MEDICINE

## 2019-06-06 PROCEDURE — 96372 THER/PROPH/DIAG INJ SC/IM: CPT | Performed by: INTERNAL MEDICINE

## 2019-06-06 PROCEDURE — 87210 SMEAR WET MOUNT SALINE/INK: CPT | Performed by: INTERNAL MEDICINE

## 2019-06-06 PROCEDURE — 36415 COLL VENOUS BLD VENIPUNCTURE: CPT | Performed by: INTERNAL MEDICINE

## 2019-06-06 PROCEDURE — 87491 CHLMYD TRACH DNA AMP PROBE: CPT | Performed by: INTERNAL MEDICINE

## 2019-06-06 PROCEDURE — 99214 OFFICE O/P EST MOD 30 MIN: CPT | Mod: 25 | Performed by: INTERNAL MEDICINE

## 2019-06-06 RX ORDER — MEDROXYPROGESTERONE ACETATE 150 MG/ML
150 INJECTION, SUSPENSION INTRAMUSCULAR
Status: ACTIVE | OUTPATIENT
Start: 2019-06-06 | End: 2043-10-28

## 2019-06-06 RX ADMIN — MEDROXYPROGESTERONE ACETATE 150 MG: 150 INJECTION, SUSPENSION INTRAMUSCULAR at 13:47

## 2019-06-06 ASSESSMENT — ANXIETY QUESTIONNAIRES
2. NOT BEING ABLE TO STOP OR CONTROL WORRYING: SEVERAL DAYS
5. BEING SO RESTLESS THAT IT IS HARD TO SIT STILL: SEVERAL DAYS
IF YOU CHECKED OFF ANY PROBLEMS ON THIS QUESTIONNAIRE, HOW DIFFICULT HAVE THESE PROBLEMS MADE IT FOR YOU TO DO YOUR WORK, TAKE CARE OF THINGS AT HOME, OR GET ALONG WITH OTHER PEOPLE: NOT DIFFICULT AT ALL
3. WORRYING TOO MUCH ABOUT DIFFERENT THINGS: NOT AT ALL
7. FEELING AFRAID AS IF SOMETHING AWFUL MIGHT HAPPEN: NOT AT ALL
GAD7 TOTAL SCORE: 5
6. BECOMING EASILY ANNOYED OR IRRITABLE: NOT AT ALL
1. FEELING NERVOUS, ANXIOUS, OR ON EDGE: MORE THAN HALF THE DAYS

## 2019-06-06 ASSESSMENT — PATIENT HEALTH QUESTIONNAIRE - PHQ9
5. POOR APPETITE OR OVEREATING: SEVERAL DAYS
SUM OF ALL RESPONSES TO PHQ QUESTIONS 1-9: 8

## 2019-06-06 NOTE — PROGRESS NOTES
More discharge than usual  No itching irritation  No fevers or chills  Bleeding is better than last time  Mood is improved    Seeing therapist

## 2019-06-06 NOTE — PROGRESS NOTES
SUBJECTIVE:                                                      HPI: Saritha Ferrera is a pleasant 19 year old female who presents for follow-up:    Patient is due for her Depo shot. Unlike last time she was on Depo, patient reports minimal bleeding (she switched from Depo to Nuvaring due to heavy periods with Depo).     Patient also reports improved mood off of NuvaRing and on Wellbutrin.    Patient complains of abnormal vaginal discharge.  Describes having more discharge than usual.  Discharge is normal in color, smell, and thickness. No vaginal itching, irritation, or discomfort. No pelvic or abdominal pain. No fevers or chills.    Finally, patient would like to get STD testing today. No known STD contacts/exposures. Other than above, asymptomatic: no rashes, lesions, or ulcers, no red and swollen joints.    The medication, allergy, and problem lists have been reviewed and updated as appropriate.     OBJECTIVE:                                                      BP 96/62   Pulse 87   Resp 16   Wt 61.6 kg (135 lb 12.8 oz)   SpO2 98%   BMI 20.35 kg/m    Constitutional: well-appearing  Genitourinary: external genitalia, urethral meatus, and vagina normal; no abnormal discharge noted  Psych: normal judgment and insight; normal mood and affect; recent and remote memory intact    ASSESSMENT/PLAN:                                                      (N89.8) Vaginal discharge  (primary encounter diagnosis)  Comment: more discharge than usual; no symptoms otherwise.  Plan: swabs for wet prep and GC/C collected today.    (Z11.3) Screen for STD (sexually transmitted disease)  Comment: asymptomatic other than above.  Plan: in addition to above, serum studies for HIV, hepatitis B, hepatitis C, and syphilis.    (Z30.42) Encounter for surveillance of injectable contraceptive  Plan: Depo shot given today.    The instructions on the AVS were discussed and explained to the patient. Patient expressed understanding of  instructions.    (Chart documentation was completed, in part, with [employer] voice-recognition software. Even though reviewed, some grammatical, spelling, and word errors may remain.)    Vita Ren MD   [address] [address]  T: [phone], F: [phone]

## 2019-06-07 LAB
C TRACH DNA SPEC QL NAA+PROBE: NEGATIVE
HBV SURFACE AG SERPL QL IA: NONREACTIVE
HCV AB SERPL QL IA: NONREACTIVE
HIV 1+2 AB+HIV1 P24 AG SERPL QL IA: NONREACTIVE
N GONORRHOEA DNA SPEC QL NAA+PROBE: NEGATIVE
SPECIMEN SOURCE: NORMAL
SPECIMEN SOURCE: NORMAL
T PALLIDUM AB SER QL: NONREACTIVE

## 2019-06-07 ASSESSMENT — ANXIETY QUESTIONNAIRES: GAD7 TOTAL SCORE: 5

## 2019-08-21 LAB — PHQ9 SCORE: 17

## 2019-09-10 ENCOUNTER — ALLIED HEALTH/NURSE VISIT (OUTPATIENT)
Dept: NURSING | Facility: CLINIC | Age: 19
End: 2019-09-10
Payer: COMMERCIAL

## 2019-09-10 DIAGNOSIS — Z30.42 ENCOUNTER FOR SURVEILLANCE OF INJECTABLE CONTRACEPTIVE: Primary | ICD-10-CM

## 2019-09-10 LAB — HCG UR QL: NEGATIVE

## 2019-09-10 PROCEDURE — 81025 URINE PREGNANCY TEST: CPT | Performed by: INTERNAL MEDICINE

## 2019-09-10 PROCEDURE — 96372 THER/PROPH/DIAG INJ SC/IM: CPT

## 2019-09-10 RX ADMIN — MEDROXYPROGESTERONE ACETATE 150 MG: 150 INJECTION, SUSPENSION INTRAMUSCULAR at 14:35

## 2019-10-02 ENCOUNTER — HEALTH MAINTENANCE LETTER (OUTPATIENT)
Age: 19
End: 2019-10-02

## 2019-10-06 DIAGNOSIS — F41.1 GAD (GENERALIZED ANXIETY DISORDER): ICD-10-CM

## 2019-10-06 RX ORDER — BUPROPION HYDROCHLORIDE 150 MG/1
TABLET ORAL
Qty: 30 TABLET | Refills: 0 | OUTPATIENT
Start: 2019-10-06

## 2019-11-03 LAB — PHQ9 SCORE: 14

## 2019-11-21 ENCOUNTER — ANCILLARY PROCEDURE (OUTPATIENT)
Dept: GENERAL RADIOLOGY | Facility: CLINIC | Age: 19
End: 2019-11-21
Attending: FAMILY MEDICINE
Payer: COMMERCIAL

## 2019-11-21 ENCOUNTER — OFFICE VISIT (OUTPATIENT)
Dept: URGENT CARE | Facility: URGENT CARE | Age: 19
End: 2019-11-21
Payer: COMMERCIAL

## 2019-11-21 VITALS
TEMPERATURE: 98.2 F | HEART RATE: 72 BPM | SYSTOLIC BLOOD PRESSURE: 120 MMHG | DIASTOLIC BLOOD PRESSURE: 80 MMHG | OXYGEN SATURATION: 99 %

## 2019-11-21 DIAGNOSIS — S89.91XA KNEE INJURY, RIGHT, INITIAL ENCOUNTER: ICD-10-CM

## 2019-11-21 DIAGNOSIS — S89.91XA KNEE INJURY, RIGHT, INITIAL ENCOUNTER: Primary | ICD-10-CM

## 2019-11-21 PROCEDURE — 99214 OFFICE O/P EST MOD 30 MIN: CPT | Performed by: FAMILY MEDICINE

## 2019-11-21 PROCEDURE — 73562 X-RAY EXAM OF KNEE 3: CPT | Mod: RT

## 2019-11-21 RX ORDER — NAPROXEN 500 MG/1
500 TABLET ORAL 2 TIMES DAILY WITH MEALS
Qty: 14 TABLET | Refills: 0 | Status: SHIPPED | OUTPATIENT
Start: 2019-11-21 | End: 2019-11-28

## 2019-11-21 RX ORDER — ESCITALOPRAM OXALATE 10 MG/1
TABLET ORAL
Refills: 5 | COMMUNITY
Start: 2019-10-21 | End: 2021-01-20

## 2019-11-21 NOTE — PROGRESS NOTES
SUBJECTIVE:  Chief Complaint   Patient presents with     Urgent Care     Pt states right knee pain, swelling sxs 1x weeks on an off    .ident presents with a chief complaint of right knee.  The injury occurred 10 days ago.   The injury happened while while walking.   How: trauma: immediate pain  The patient complained of moderate pain and has had decreased ROM.    Pain exacerbated by movement    He treated it initially with no therapy.   This is the first time this type of injury has occurred to this patient.     Past Medical History:   Diagnosis Date     Adjustment disorder with depressed mood 11/4/2013     Hx of migraines      Migraine with aura 4/10/2015     Allergies   Allergen Reactions     No Known Drug Allergies      Social History     Tobacco Use     Smoking status: Light Tobacco Smoker     Smokeless tobacco: Current User     Tobacco comment: 1 cig/day   Substance Use Topics     Alcohol use: No     Alcohol/week: 0.0 standard drinks     Comment: 10/24/13 hf  04/10/2015 l.n. [facility]ley socially 1/25/2017 l.n       ROSINTEGUMENTARY/SKIN: NEGATIVE for open wound/bleeding and NEGATIVE for bruising  MUSCULOSKELETAL: NEGATIVE for joint swelling, paresthesias, radicular pain    EXAM: /80   Pulse 72   Temp 98.2  F (36.8  C) (Tympanic)   SpO2 99% Gen: healthy,alert,no distress  Extremity: knee has pain with palpation and rom.   There is not compromise to the distal circulation.  Pulses are +2 and CRT is brisk.GENERAL APPEARANCE: healthy, alert and no distress  EXTREMITIES: peripheral pulses normal  SKIN: no suspicious lesions or rashes  NEURO: Normal strength and tone, sensory exam grossly normal, mentation intact and speech normal    Xray without acute findings, no fx read by Chepe Lorenzo D.O.      ICD-10-CM    1. Knee injury, right, initial encounter S89.91XA XR Knee Right 3 Views     order for DME     naproxen (NAPROSYN) 500 MG tablet     RICE

## 2019-12-02 ENCOUNTER — ALLIED HEALTH/NURSE VISIT (OUTPATIENT)
Dept: NURSING | Facility: CLINIC | Age: 19
End: 2019-12-02
Payer: COMMERCIAL

## 2019-12-02 DIAGNOSIS — Z30.42 ENCOUNTER FOR SURVEILLANCE OF INJECTABLE CONTRACEPTIVE: Primary | ICD-10-CM

## 2019-12-02 PROCEDURE — 96372 THER/PROPH/DIAG INJ SC/IM: CPT

## 2019-12-02 RX ADMIN — MEDROXYPROGESTERONE ACETATE 150 MG: 150 INJECTION, SUSPENSION INTRAMUSCULAR at 14:32

## 2019-12-02 NOTE — PROGRESS NOTES
BP: Data Unavailable    LAST PAP/EXAM: No results found for: PAP  URINE HCG:not indicated    The following medication was given:     MEDICATION: Depo Provera 150mg  ROUTE: IM  SITE: [facility]  : [facility]  LOT #: [id number]  EXP:7/2020  NEXT INJECTION DUE: 2/17/20 - 3/2/20   Provider: Dr. Ren

## 2019-12-13 ENCOUNTER — OFFICE VISIT (OUTPATIENT)
Dept: OBGYN | Facility: CLINIC | Age: 19
End: 2019-12-13
Payer: COMMERCIAL

## 2019-12-13 VITALS
BODY MASS INDEX: 22.22 KG/M2 | DIASTOLIC BLOOD PRESSURE: 70 MMHG | WEIGHT: 150 LBS | SYSTOLIC BLOOD PRESSURE: 104 MMHG | HEIGHT: 69 IN

## 2019-12-13 DIAGNOSIS — Z30.42 SURVEILLANCE OF CONTRACEPTIVE INJECTION: ICD-10-CM

## 2019-12-13 DIAGNOSIS — Z11.3 SCREEN FOR STD (SEXUALLY TRANSMITTED DISEASE): ICD-10-CM

## 2019-12-13 DIAGNOSIS — Z12.4 SCREENING FOR CERVICAL CANCER: ICD-10-CM

## 2019-12-13 DIAGNOSIS — Z01.419 ENCOUNTER FOR GYNECOLOGICAL EXAMINATION WITHOUT ABNORMAL FINDING: Primary | ICD-10-CM

## 2019-12-13 PROCEDURE — 87591 N.GONORRHOEAE DNA AMP PROB: CPT | Performed by: OBSTETRICS & GYNECOLOGY

## 2019-12-13 PROCEDURE — 87340 HEPATITIS B SURFACE AG IA: CPT | Performed by: OBSTETRICS & GYNECOLOGY

## 2019-12-13 PROCEDURE — 87491 CHLMYD TRACH DNA AMP PROBE: CPT | Performed by: OBSTETRICS & GYNECOLOGY

## 2019-12-13 PROCEDURE — 86780 TREPONEMA PALLIDUM: CPT | Performed by: OBSTETRICS & GYNECOLOGY

## 2019-12-13 PROCEDURE — G0145 SCR C/V CYTO,THINLAYER,RESCR: HCPCS | Performed by: OBSTETRICS & GYNECOLOGY

## 2019-12-13 PROCEDURE — 99395 PREV VISIT EST AGE 18-39: CPT | Performed by: OBSTETRICS & GYNECOLOGY

## 2019-12-13 PROCEDURE — 86803 HEPATITIS C AB TEST: CPT | Performed by: OBSTETRICS & GYNECOLOGY

## 2019-12-13 PROCEDURE — 87389 HIV-1 AG W/HIV-1&-2 AB AG IA: CPT | Performed by: OBSTETRICS & GYNECOLOGY

## 2019-12-13 PROCEDURE — 36415 COLL VENOUS BLD VENIPUNCTURE: CPT | Performed by: OBSTETRICS & GYNECOLOGY

## 2019-12-13 ASSESSMENT — MIFFLIN-ST. JEOR: SCORE: 1501.84

## 2019-12-13 NOTE — NURSING NOTE
"Chief Complaint   Patient presents with     Physical     std testing       Initial /70 (BP Location: Right arm, Patient Position: Chair, Cuff Size: Adult Regular)   Ht 1.74 m (5' 8.5\")   Wt 68 kg (150 lb)   BMI 22.48 kg/m   Estimated body mass index is 22.48 kg/m  as calculated from the following:    Height as of this encounter: 1.74 m (5' 8.5\").    Weight as of this encounter: 68 kg (150 lb).  BP completed using cuff size: regular    Questioned patient about current smoking habits.  Pt. currently smokes.  Advised about smoking cessation.          The following HM Due: pap smear, GC/C    Jessica Frye CMA           "

## 2019-12-13 NOTE — PROGRESS NOTES
SUBJECTIVE:   CC: Saritha Ferrera is an 19 year old woman who presents for preventive health visit.     Healthy Habits:    Do you get at least three servings of calcium containing foods daily (dairy, green leafy vegetables, etc.)? no, taking calcium and/or vitamin D supplement: no    Amount of exercise or daily activities, outside of work: 1 day(s) per week    Problems taking medications regularly Yes occasionally forgets    Medication side effects: No    Have you had an eye exam in the past two years? no    Do you see a dentist twice per year? yes    Do you have sleep apnea, excessive snoring or daytime drowsiness?no      Needs Pap (1st ever), STD screening, and Rx Depo-Provera which she is current on and doing well.    Today's PHQ-2 Score:   PHQ-2 ( 1999 [employer]) 10/9/2017 1/25/2017   Q1: Little interest or pleasure in doing things 3 2   Q2: Feeling down, depressed or hopeless 3 3   PHQ-2 Score 6 5       Abuse: Current or Past(Physical, Sexual or Emotional)- Not current but in the past.  Do you feel safe in your environment? Yes        Social History     Tobacco Use     Smoking status: Light Tobacco Smoker     Smokeless tobacco: Current User     Tobacco comment: 1 cig/day   Substance Use Topics     Alcohol use: Yes     Alcohol/week: 0.0 standard drinks     Comment: 10/24/13 hf  04/10/2015 l.n. rare[place] socially 1/25/2017 l.n     If you drink alcohol do you typically have >3 drinks per day or >7 drinks per week? No                     Reviewed orders with patient.  Reviewed health maintenance and updated orders accordingly - Yes  Current Outpatient Medications   Medication Sig Dispense Refill     buPROPion (WELLBUTRIN XL) 300 MG 24 hr tablet Take 1 tablet (300 mg) by mouth every morning 90 tablet 1     calcium carbonate (TUMS) 500 MG chewable tablet Take 1 chew tab by mouth 2 times daily       escitalopram (LEXAPRO) 10 MG tablet TK 1 T PO D  5     propranolol ER (INDERAL LA) 60 MG 24 hr capsule Take 1 capsule (60  "mg) by mouth daily 90 capsule 1     Ibuprofen (ADVIL PO)        order for DME Elastic knee brace (Patient not taking: Reported on 2019) 1 Device 0     Allergies   Allergen Reactions     No Known Drug Allergies        Mammogram not appropriate for this patient based on age.    Pertinent mammograms are reviewed under the imaging tab.  History of abnormal Pap smear: NO - age 21-29 PAP every 3 years recommended     Reviewed and updated as needed this visit by clinical staff  Tobacco  Allergies  Meds  Med Hx  Surg Hx  Fam Hx  Soc Hx        Reviewed and updated as needed this visit by Provider  Allergies  Meds        Past Medical History:   Diagnosis Date     Adjustment disorder with depressed mood 2013     Hx of migraines      Migraine with aura 4/10/2015      Past Surgical History:   Procedure Laterality Date     HC TOOTH EXTRACTION W/FORCEP       NO HISTORY OF SURGERY       OB History    Para Term  AB Living   0 0 0 0 0 0   SAB TAB Ectopic Multiple Live Births   0 0 0 0 0       ROS:  CONSTITUTIONAL: NEGATIVE for fever, chills, change in weight  INTEGUMENTARU/SKIN: NEGATIVE for worrisome rashes, moles or lesions  EYES: NEGATIVE for vision changes or irritation  ENT: NEGATIVE for ear, mouth and throat problems  RESP: NEGATIVE for significant cough or SOB  BREAST: NEGATIVE for masses, tenderness or discharge  CV: NEGATIVE for chest pain, palpitations or peripheral edema  GI: NEGATIVE for nausea, abdominal pain, heartburn, or change in bowel habits  : NEGATIVE for unusual urinary or vaginal symptoms. Periods are regular.  MUSCULOSKELETAL: NEGATIVE for significant arthralgias or myalgia  NEURO: NEGATIVE for weakness, dizziness or paresthesias  PSYCHIATRIC: NEGATIVE for changes in mood or affect    OBJECTIVE:   /70 (BP Location: Right arm, Patient Position: Chair, Cuff Size: Adult Regular)   Ht 1.74 m (5' 8.5\")   Wt 68 kg (150 lb)   BMI 22.48 kg/m    EXAM:  GENERAL: healthy, alert " and no distress  EYES: Eyes grossly normal to inspection, PERRL and conjunctivae and sclerae normal  HENT: ear canals and TM's normal, nose and mouth without ulcers or lesions  NECK: no adenopathy, no asymmetry, masses, or scars and thyroid normal to palpation  RESP: lungs clear to auscultation - no rales, rhonchi or wheezes  BREAST: normal without masses, tenderness or nipple discharge and no palpable axillary masses or adenopathy  CV: regular rate and rhythm, normal S1 S2, no S3 or S4, no murmur, click or rub, no peripheral edema and peripheral pulses strong  ABDOMEN: soft, nontender, no hepatosplenomegaly, no masses and bowel sounds normal   (female): normal female external genitalia, normal urethral meatus, vaginal mucosa pink, moist, well rugated, and normal cervix/adnexa/uterus without masses or discharge  MS: no gross musculoskeletal defects noted, no edema  SKIN: no suspicious lesions or rashes  NEURO: Normal strength and tone, mentation intact and speech normal  PSYCH: mentation appears normal, affect normal/bright    Diagnostic Test Results:  none     ASSESSMENT/PLAN:       ICD-10-CM    1. Encounter for gynecological examination without abnormal finding Z01.419    2. Screening for cervical cancer Z12.4 Pap imaged thin layer screen only - recommended age 21 - 24 years   3. Screen for STD (sexually transmitted disease) Z11.3 NEISSERIA GONORRHOEA PCR     CHLAMYDIA TRACHOMATIS PCR     Treponema Abs w Reflex to RPR and Titer     HIV Antigen Antibody Combo     Hepatitis C antibody     Hepatitis B surface antigen   4. Surveillance of contraceptive injection Z30.42        COUNSELING:   Reviewed preventive health counseling, as reflected in patient instructions  Special attention given to:        Cessation of chronic marijuana use.       Healthy diet/nutrition       Contraception       Safe sex practices/STD prevention    Estimated body mass index is 22.48 kg/m  as calculated from the following:    Height as of  "this encounter: 1.74 m (5' 8.5\").    Weight as of this encounter: 68 kg (150 lb).         reports that she has been smoking. She uses smokeless tobacco.  Tobacco Cessation Action Plan: Information offered: Patient not interested at this time    Counseling Resources:  ATP IV Guidelines  Pooled Cohorts Equation Calculator  Breast Cancer Risk Calculator  FRAX Risk Assessment  ICSI Preventive Guidelines  Dietary Guidelines for Americans, 2010  USDA's MyPlate  ASA Prophylaxis  Lung CA Screening    Wicho Corea MD  [facility]  "

## 2019-12-14 LAB — T PALLIDUM AB SER QL: NONREACTIVE

## 2019-12-16 LAB
HBV SURFACE AG SERPL QL IA: NONREACTIVE
HCV AB SERPL QL IA: NONREACTIVE
HIV 1+2 AB+HIV1 P24 AG SERPL QL IA: NONREACTIVE

## 2019-12-17 LAB
COPATH REPORT: NORMAL
PAP: NORMAL

## 2019-12-19 ENCOUNTER — TRANSFERRED RECORDS (OUTPATIENT)
Dept: HEALTH INFORMATION MANAGEMENT | Facility: CLINIC | Age: 19
End: 2019-12-19

## 2019-12-29 LAB — PHQ9 SCORE: 12

## 2020-02-10 ENCOUNTER — TRANSFERRED RECORDS (OUTPATIENT)
Dept: HEALTH INFORMATION MANAGEMENT | Facility: CLINIC | Age: 20
End: 2020-02-10

## 2020-02-17 ENCOUNTER — ALLIED HEALTH/NURSE VISIT (OUTPATIENT)
Dept: NURSING | Facility: CLINIC | Age: 20
End: 2020-02-17
Payer: COMMERCIAL

## 2020-02-17 DIAGNOSIS — Z30.42 ENCOUNTER FOR SURVEILLANCE OF INJECTABLE CONTRACEPTIVE: Primary | ICD-10-CM

## 2020-02-17 PROCEDURE — 96372 THER/PROPH/DIAG INJ SC/IM: CPT

## 2020-02-17 RX ADMIN — MEDROXYPROGESTERONE ACETATE 150 MG: 150 INJECTION, SUSPENSION INTRAMUSCULAR at 14:31

## 2020-03-05 NOTE — NURSING NOTE
Clinic Administered Medication Documentation      Depo Provera Documentation    URINE HCG: not indicated    Depo-Provera Standing Order inclusion/exclusion criteria reviewed.   Patient meets: inclusion criteria     BP: Data Unavailable  LAST PAP/EXAM:   Lab Results   Component Value Date    PAP NIL 12/13/2019       Prior to injection, verified patient identity using patient's name and date of birth. Medication was administered. Please see MAR and medication order for additional information.     Was entire vial of medication used? Yes  Vial/Syringe: Single dose vial  Expiration Date:  10/2020    Patient instructed to remain in clinic for 15 minutes.  NEXT INJECTION DUE: 05/5-5/19

## 2020-03-16 ENCOUNTER — VIRTUAL VISIT (OUTPATIENT)
Dept: FAMILY MEDICINE | Facility: OTHER | Age: 20
End: 2020-03-16

## 2020-03-16 NOTE — PROGRESS NOTES
"Date: 2020 11:46:49  Clinician: Jodee Bravo  Clinician NPI: [id number]  Patient: Saritha Ferrera  Patient : 2000  Patient Address: [address] [place] [address] [place] [address]  Patient Phone: [phone]  Visit Protocol: URI  Patient Summary:  Saritha is a 19 year old ( : 2000 ) female who initiated a Visit for COVID-19 (Coronavirus) evaluation and screening. When asked the question \"Please sign me up to receive news, health information and promotions from [employer].\", Saritha responded \"No\".    Saritha states her symptoms started today.   Her symptoms consist of ear pain, a headache, rhinitis, facial pain or pressure, myalgia, a sore throat, and a cough.   Symptom details     Nasal secretions: The color of her mucus is clear.    Cough: Saritha coughs a few times an hour and her cough is not more bothersome at night. Phlegm does not come into her throat when she coughs. She does not believe her cough is caused by post-nasal drip.     Sore throat: Saritha reports having mild throat pain (1-3 on a 10 point pain scale), does not have exudate on her tonsils, and can swallow liquids. She is not sure if the lymph nodes in her neck are enlarged. A rash has not appeared on the skin since the sore throat started.     Facial pain or pressure: The facial pain or pressure feels worse when bending over or leaning forward.     Headache: She states the headache is mild (1-3 on a 10 point pain scale).      Saritha denies having malaise, fever, chills, wheezing, nasal congestion, and teeth pain. She also denies having recent facial or sinus surgery in the past 60 days and taking antibiotic medication for the symptoms. She is not experiencing dyspnea.   Precipitating events  Within the past week, Saritha has not been exposed to someone with strep throat. She has recently been exposed to someone with influenza. Saritha has been in close contact with the following high risk individuals: people with asthma, heart disease or diabetes, " adults 65 or older, and children under the age of 5.   Pertinent COVID-19 (Coronavirus) information  Saritha has not traveled internationally or to the areas where COVID-19 (Coronavirus) is widespread in the last 14 days before the start of her symptoms.   Saritha has not had close contact with a suspected or laboratory-confirmed COVID-19 patient within 14 days of symptom onset.   Saritha is not a healthcare worker and does not work in a healthcare facility.   Pertinent medical history  Saritha does not get yeast infections when she takes antibiotics.   Saritha needs a return to work/school note.   Weight: 160 lbs   Saritha smokes or uses smokeless tobacco.   She denies pregnancy and denies breastfeeding. She does not menstruate.   Weight: 160 lbs    MEDICATIONS: escitalopram oxalate oral, bupropion HCl oral, propranolol-hydrochlorothiazide oral, ALLERGIES: NKDA  Clinician Response:  Dear Saritha,  Based on the information provided, you have a viral upper respiratory infection, otherwise known as a cold. Symptoms vary from person to person, but can include sneezing, coughing, a runny nose, sore throat, and headache and range from mild to severe.  Unfortunately, there are no medications that can cure a cold, so treatment is focused on controlling symptoms as much as possible. Most people gradually feel better until symptoms are gone in 1-2 weeks.  Medication information  Because you have a viral infection, antibiotics will not help you get better. Treating a viral infection with antibiotics could actually make you feel worse.  Unless you are allergic to the over-the-counter medication(s) below, I recommend using:       Acetaminophen (Tylenol or store brand) oral tablet. Take 1-2 tablets by mouth every 4-6 hours to help with the discomfort.      Guaifenesin + dextromethorphan (Robitussin DM, Mucinex DM, or store brand).      Saline nasal spray (Dallam or store brand). Use 1-2 sprays in each nostril 3 times a day as needed for  congestion.     Over-the-counter medications do not require a prescription. Ask the pharmacist if you have any questions.  Self care  The following tips will keep you as comfortable as possible while you recover:     Rest    Drink plenty of water and other liquids    Take a hot shower to loosen congestion    Use throat lozenges    Gargle with warm salt water (1/4 teaspoon of salt per 8 ounce glass of water)    Suck on frozen items such as popsicles or ice cubes    Drink hot tea with lemon and honey    Take a spoonful of honey to reduce your cough     Also, as your provider, I need you to know that becoming tobacco-free is the most important thing you can do to protect your current and future health.  When to seek care  Please be seen in a clinic or urgent care if new symptoms develop, or symptoms become worse.  Call 911 or go to the emergency room if you feel that your throat is closing off, you suddenly develop a rash, you are unable to swallow fluids, you are drooling, or you are having difficulty breathing.  Additional treatment plan         Based on the information you have provided, you do have symptoms that are consistent with Coronavirus (COVID-19).   The coronavirus causes mild to severe respiratory illness with the most common symptoms including fever, cough and difficulty breathing. Unfortunately, many viruses cause similar symptoms and it can be difficult to distinguish between viruses, especially in mild cases, so we are presuming that anyone with cough or fever has coronavirus at this time.  Coronavirus/COVID-19 has reached the point of community spread in [place], meaning that we are finding the virus in people with no known exposure risk for erin the virus. Given the increasing commonness of coronavirus in the community we are focusing testing on those people at highest risk of developing significant complications of the disease. This includes people who are over age 60, have Hypertension,  Diabetes, Heart conditions such as heart failure or previous heart attack, end stage renal disease, chronic liver conditions, COPD or Emphysema, Asthma, Interstitial Lung Disease, Cancer, transplant recipients, HIV, individuals on chemotherapy or immunosuppressive medications.  If you believe you may have a condition that puts you at high risk that is not listed above, please contact your specialty provider to discuss if you should be tested.  Those such as yourself who are younger and healthy may not be offered testing and should assume are infected with coronavirus. Accordingly, you should self-quarantine for fourteen days. You should call if you find increasing shortness of breath, wheezing or sustained fever above 101.5. If you are significantly short of breath or experience chest pain you should call 911 or report to the nearest emergency department for urgent evaluation.   Isolate Yourself:    Isolate yourself at home.   Do Not allow any visitors  Do Not go to work or school  Do Not go to [employer],  centers, shopping, or other public places.  Do Not shake hands.  Avoid close contact with others (hugging, kissing).Protect Others:     Cover Your Mouth and Nose with a mask, disposable tissue or wash cloth to avoid spreading germs to others.  Wash your hands and face frequently with soap and water.   If you develop significant shortness of breath that prevents you from doing normal activities, please call 911 or proceed to the nearest emergency room and alert them immediately that you have been in self-isolation for possible coronavirus.   For more information about COVID19 and options for caring for yourself at home, please visit the CDC website at https://www.cdc.gov/coronavirus/2019-ncov/about/steps-when-sick.htmlFor more options for care at [facility], please visit our website at [email or web address]        Diagnosis: Cough  Diagnosis ICD: R05

## 2020-05-12 ENCOUNTER — ALLIED HEALTH/NURSE VISIT (OUTPATIENT)
Dept: NURSING | Facility: CLINIC | Age: 20
End: 2020-05-12
Payer: COMMERCIAL

## 2020-05-12 DIAGNOSIS — Z30.42 ENCOUNTER FOR SURVEILLANCE OF INJECTABLE CONTRACEPTIVE: Primary | ICD-10-CM

## 2020-05-12 PROCEDURE — 96372 THER/PROPH/DIAG INJ SC/IM: CPT

## 2020-05-12 RX ADMIN — MEDROXYPROGESTERONE ACETATE 150 MG: 150 INJECTION, SUSPENSION INTRAMUSCULAR at 14:31

## 2020-05-12 NOTE — NURSING NOTE
Chief Complaint   Patient presents with     Imm/Inj     Depo     Clinic Administered Medication Documentation      Depo Provera Documentation    URINE HCG: not indicated    Depo-Provera Standing Order inclusion/exclusion criteria reviewed.   Patient meets: inclusion criteria     BP: Data Unavailable  LAST PAP/EXAM:   Lab Results   Component Value Date    PAP NIL 12/13/2019       Prior to injection, verified patient identity using patient's name and date of birth. Medication was administered. Please see MAR and medication order for additional information.     Was entire vial of medication used? Yes  Vial/Syringe: Single dose vial  Expiration Date:  10/31/2020    Patient instructed to remain in clinic for 15 minutes, report any adverse reaction to staff immediately  and stay in clinic after the injection but patient declined.  NEXT INJECTION DUE: 7/28/20 - 8/11/20        MARINA Devi

## 2020-05-18 ENCOUNTER — TRANSFERRED RECORDS (OUTPATIENT)
Dept: HEALTH INFORMATION MANAGEMENT | Facility: CLINIC | Age: 20
End: 2020-05-18

## 2020-07-08 ENCOUNTER — OFFICE VISIT (OUTPATIENT)
Dept: INTERNAL MEDICINE | Facility: CLINIC | Age: 20
End: 2020-07-08
Payer: COMMERCIAL

## 2020-07-08 VITALS
RESPIRATION RATE: 18 BRPM | WEIGHT: 178.6 LBS | BODY MASS INDEX: 26.76 KG/M2 | DIASTOLIC BLOOD PRESSURE: 76 MMHG | SYSTOLIC BLOOD PRESSURE: 100 MMHG | OXYGEN SATURATION: 98 % | HEART RATE: 80 BPM

## 2020-07-08 DIAGNOSIS — B07.0 PLANTAR WARTS: Primary | ICD-10-CM

## 2020-07-08 PROCEDURE — 17110 DESTRUCTION B9 LES UP TO 14: CPT | Performed by: PHYSICIAN ASSISTANT

## 2020-07-08 ASSESSMENT — ANXIETY QUESTIONNAIRES
2. NOT BEING ABLE TO STOP OR CONTROL WORRYING: NOT AT ALL
6. BECOMING EASILY ANNOYED OR IRRITABLE: MORE THAN HALF THE DAYS
1. FEELING NERVOUS, ANXIOUS, OR ON EDGE: SEVERAL DAYS
IF YOU CHECKED OFF ANY PROBLEMS ON THIS QUESTIONNAIRE, HOW DIFFICULT HAVE THESE PROBLEMS MADE IT FOR YOU TO DO YOUR WORK, TAKE CARE OF THINGS AT HOME, OR GET ALONG WITH OTHER PEOPLE: SOMEWHAT DIFFICULT
3. WORRYING TOO MUCH ABOUT DIFFERENT THINGS: SEVERAL DAYS
7. FEELING AFRAID AS IF SOMETHING AWFUL MIGHT HAPPEN: NOT AT ALL
5. BEING SO RESTLESS THAT IT IS HARD TO SIT STILL: NOT AT ALL
GAD7 TOTAL SCORE: 5

## 2020-07-08 ASSESSMENT — PATIENT HEALTH QUESTIONNAIRE - PHQ9
5. POOR APPETITE OR OVEREATING: SEVERAL DAYS
SUM OF ALL RESPONSES TO PHQ QUESTIONS 1-9: 4

## 2020-07-08 NOTE — PROGRESS NOTES
Subjective     Saritha Ferrera is a 20 year old female who presents to clinic today for the following health issues:    HPI     WART(S)      Onset: 2 years    Description (location/number): 4 on right foot    Accompanying signs and symptoms: Painful: YES    History: prior warts: YES- childhood    Therapies tried and outcome: OTC stuff and they are still there      Reviewed and updated as needed this visit by Provider  Meds  Problems             Objective    /76   Pulse 80   Resp 18   Wt 81 kg (178 lb 9.6 oz)   SpO2 98%   BMI 26.76 kg/m    Body mass index is 26.76 kg/m .  Physical Exam   GENERAL: healthy, alert and no distress  SKIN: two clusters of rough textured lesions on plantar surface of right foot     Diagnostic Test Results:  Labs reviewed in Epic        Assessment & Plan     1. Plantar warts  - 3 rounds of liquid nitrogen to both clusters performed  - reviewed risks, other treatment options and effectiveness, pt verbally agreed   - follow up in 1 month if still present   - DESTRUCT BENIGN LESION, UP TO 14       No follow-ups on file.    Brigid Jackson PA-C  [facility]

## 2020-07-09 ASSESSMENT — ANXIETY QUESTIONNAIRES: GAD7 TOTAL SCORE: 5

## 2020-07-28 ENCOUNTER — ALLIED HEALTH/NURSE VISIT (OUTPATIENT)
Dept: NURSING | Facility: CLINIC | Age: 20
End: 2020-07-28
Payer: COMMERCIAL

## 2020-07-28 DIAGNOSIS — Z30.42 ENCOUNTER FOR SURVEILLANCE OF INJECTABLE CONTRACEPTIVE: Primary | ICD-10-CM

## 2020-07-28 PROCEDURE — 96372 THER/PROPH/DIAG INJ SC/IM: CPT

## 2020-07-28 RX ADMIN — MEDROXYPROGESTERONE ACETATE 150 MG: 150 INJECTION, SUSPENSION INTRAMUSCULAR at 15:52

## 2020-07-28 NOTE — PROGRESS NOTES
Clinic Administered Medication Documentation      Depo Provera Documentation    URINE HCG: not indicated    Depo-Provera Standing Order inclusion/exclusion criteria reviewed.   Patient meets: inclusion criteria     BP: Data Unavailable  LAST PAP/EXAM:   Lab Results   Component Value Date    PAP NIL 12/13/2019       Prior to injection, verified patient identity using patient's name and date of birth. Medication was administered. Please see MAR and medication order for additional information.     Was entire vial of medication used? Yes  Vial/Syringe: Single dose vial  Expiration Date:  Mar 2021    Patient instructed to report any adverse reaction to staff immediately  and stay in clinic after the injection but patient declined.  NEXT INJECTION DUE: 10/13/20 - 10/27/20

## 2020-10-20 ENCOUNTER — ALLIED HEALTH/NURSE VISIT (OUTPATIENT)
Dept: NURSING | Facility: CLINIC | Age: 20
End: 2020-10-20
Payer: COMMERCIAL

## 2020-10-20 DIAGNOSIS — Z30.9 CONTRACEPTIVE MANAGEMENT: Primary | ICD-10-CM

## 2020-10-20 PROCEDURE — 96372 THER/PROPH/DIAG INJ SC/IM: CPT

## 2020-10-20 RX ADMIN — MEDROXYPROGESTERONE ACETATE 150 MG: 150 INJECTION, SUSPENSION INTRAMUSCULAR at 13:00

## 2020-10-20 NOTE — PROGRESS NOTES
BP: Data Unavailable    LAST PAP/EXAM:   Lab Results   Component Value Date    PAP NIL 12/13/2019     URINE HCG:not indicated    The following medication was given:     MEDICATION: Depo Provera 150mg  ROUTE: IM  SITE: [facility]  : Amphastar  LOT #: [id number]  EXP:04/01/2022  NEXT INJECTION DUE: 1/5/21 - 1/19/21   Provider: Dr. Ren

## 2021-01-12 ENCOUNTER — ALLIED HEALTH/NURSE VISIT (OUTPATIENT)
Dept: NURSING | Facility: CLINIC | Age: 21
End: 2021-01-12
Payer: COMMERCIAL

## 2021-01-12 DIAGNOSIS — Z30.9 ENCOUNTER FOR CONTRACEPTIVE MANAGEMENT, UNSPECIFIED TYPE: Primary | ICD-10-CM

## 2021-01-12 PROCEDURE — 96372 THER/PROPH/DIAG INJ SC/IM: CPT

## 2021-01-12 RX ADMIN — MEDROXYPROGESTERONE ACETATE 150 MG: 150 INJECTION, SUSPENSION INTRAMUSCULAR at 14:31

## 2021-01-12 NOTE — NURSING NOTE
Clinic Administered Medication Documentation      Depo Provera Documentation    URINE HCG: not indicated    Depo-Provera Standing Order inclusion/exclusion criteria reviewed.   Patient meets: inclusion criteria     BP: Data Unavailable  LAST PAP/EXAM:   Lab Results   Component Value Date    PAP NIL 12/13/2019       Prior to injection, verified patient identity using patient's name and date of birth. Medication was administered. Please see MAR and medication order for additional information.     Was entire vial of medication used? Yes  Vial/Syringe: Single dose vial  Expiration Date:  063/2022    Patient instructed to stay in clinic after the injection but patient declined.  NEXT INJECTION DUE: 3/30/21 - 4/13/21

## 2021-01-15 ENCOUNTER — HEALTH MAINTENANCE LETTER (OUTPATIENT)
Age: 21
End: 2021-01-15

## 2021-01-20 ENCOUNTER — OFFICE VISIT (OUTPATIENT)
Dept: INTERNAL MEDICINE | Facility: CLINIC | Age: 21
End: 2021-01-20
Payer: COMMERCIAL

## 2021-01-20 VITALS
TEMPERATURE: 98.4 F | SYSTOLIC BLOOD PRESSURE: 104 MMHG | HEART RATE: 91 BPM | DIASTOLIC BLOOD PRESSURE: 72 MMHG | OXYGEN SATURATION: 97 %

## 2021-01-20 DIAGNOSIS — R09.81 NASAL CONGESTION: Primary | ICD-10-CM

## 2021-01-20 DIAGNOSIS — R05.9 COUGH: ICD-10-CM

## 2021-01-20 DIAGNOSIS — Z20.822 SUSPECTED COVID-19 VIRUS INFECTION: ICD-10-CM

## 2021-01-20 PROCEDURE — 99213 OFFICE O/P EST LOW 20 MIN: CPT | Performed by: PHYSICIAN ASSISTANT

## 2021-01-20 PROCEDURE — U0003 INFECTIOUS AGENT DETECTION BY NUCLEIC ACID (DNA OR RNA); SEVERE ACUTE RESPIRATORY SYNDROME CORONAVIRUS 2 (SARS-COV-2) (CORONAVIRUS DISEASE [COVID-19]), AMPLIFIED PROBE TECHNIQUE, MAKING USE OF HIGH THROUGHPUT TECHNOLOGIES AS DESCRIBED BY CMS-2020-01-R: HCPCS | Performed by: PHYSICIAN ASSISTANT

## 2021-01-20 PROCEDURE — U0005 INFEC AGEN DETEC AMPLI PROBE: HCPCS | Performed by: PHYSICIAN ASSISTANT

## 2021-01-20 RX ORDER — BENZONATATE 100 MG/1
100 CAPSULE ORAL 3 TIMES DAILY PRN
Qty: 30 CAPSULE | Refills: 0 | Status: SHIPPED | OUTPATIENT
Start: 2021-01-20 | End: 2021-03-25

## 2021-01-20 RX ORDER — FLUTICASONE PROPIONATE 50 MCG
1 SPRAY, SUSPENSION (ML) NASAL DAILY
Qty: 16 G | Refills: 3 | Status: SHIPPED | OUTPATIENT
Start: 2021-01-20 | End: 2021-12-12

## 2021-01-20 NOTE — PATIENT INSTRUCTIONS
Flonase/ Nasacort - over the counter steroid nasal sprays.     Monitor    Zyrtec - antihistamine

## 2021-01-20 NOTE — PROGRESS NOTES
Assessment & Plan     Nasal congestion    - fluticasone (FLONASE) 50 MCG/ACT nasal spray; Spray 1 spray into both nostrils daily    Cough    - Symptomatic COVID-19 Virus (Coronavirus) by PCR  - benzonatate (TESSALON) 100 MG capsule; Take 1 capsule (100 mg) by mouth 3 times daily as needed for cough    Suspected COVID-19 virus infection    - Symptomatic COVID-19 Virus (Coronavirus) by PCR                  {Provider  Link to [place] Help Grid :491048}     Tobacco Cessation:   reports that she has been smoking. She uses smokeless tobacco.  Tobacco Cessation Action Plan: per PCP      Patient Instructions   Flonase/ Nasacort - over the counter steroid nasal sprays.     Monitor    Zyrtec - antihistamine           Return in about 3 months (around 4/20/2021) for Physical Exam, regular primary provider.    ALINE [facility] TETE Melara is a 20 year old who presents to clinic today for the following health issues     HPI       Acute Illness  Acute illness concerns: Cough  Onset/Duration: 1.5 weeks  Symptoms:  Fever: no  Chills/Sweats: no  Headache (location?): no  Sinus Pressure: no  Conjunctivitis:  no  Ear Pain: no  Rhinorrhea: YES more stuff than runny- clear drainage.   Congestion: YES  Sore Throat: YES- Scratchy not sore  Cough: YES-non-productive, productive of clear sputum if she coughs up anything  No nighttime coughing.  Wheeze: no  Decreased Appetite: no  Nausea: no  Vomiting: no  Diarrhea: no  Dysuria/Freq.: no  Dysuria or Hematuria: no  Fatigue/Achiness: YES-   Sick/Strep Exposure: no  Therapies tried and outcome: None  No hx of asthma or allergies.  Cat allergies - topical rash in the past.  Feels like this is cold symptoms  Covid testing 7 days ago - PCR saliva test negative.         Review of Systems   Constitutional, HEENT, cardiovascular, pulmonary, gi and gu systems are negative, except as otherwise noted.      Objective    /72   Pulse  91   Temp 98.4  F (36.9  C)   SpO2 97%   There is no height or weight on file to calculate BMI.  Physical Exam   GENERAL: healthy, alert and no distress  HENT: normal cephalic/atraumatic, ear canals and TM's normal, nose and mouth without ulcers or lesions, nasal mucosa edematous , rhinorrhea clear and scant, oropharynx clear and oral mucous membranes moist  NECK: no adenopathy, no asymmetry, masses, or scars and thyroid normal to palpation  RESP: lungs clear to auscultation - no rales, rhonchi or wheezes  CV: regular rates and rhythm and normal S1 S2, no S3 or S4  SKIN: no suspicious lesions or rashes

## 2021-01-21 LAB
SARS-COV-2 RNA RESP QL NAA+PROBE: NOT DETECTED
SPECIMEN SOURCE: NORMAL

## 2021-03-25 ENCOUNTER — OFFICE VISIT (OUTPATIENT)
Dept: URGENT CARE | Facility: URGENT CARE | Age: 21
End: 2021-03-25
Payer: COMMERCIAL

## 2021-03-25 VITALS
WEIGHT: 199.19 LBS | SYSTOLIC BLOOD PRESSURE: 109 MMHG | BODY MASS INDEX: 29.5 KG/M2 | HEIGHT: 69 IN | HEART RATE: 85 BPM | DIASTOLIC BLOOD PRESSURE: 74 MMHG | TEMPERATURE: 97.8 F

## 2021-03-25 DIAGNOSIS — B37.31 CANDIDIASIS OF VULVA AND VAGINA: ICD-10-CM

## 2021-03-25 DIAGNOSIS — I88.9 LYMPHADENITIS: ICD-10-CM

## 2021-03-25 DIAGNOSIS — R07.0 THROAT PAIN: Primary | ICD-10-CM

## 2021-03-25 LAB
DEPRECATED S PYO AG THROAT QL EIA: POSITIVE
SPECIMEN SOURCE: ABNORMAL

## 2021-03-25 PROCEDURE — 87880 STREP A ASSAY W/OPTIC: CPT | Performed by: PHYSICIAN ASSISTANT

## 2021-03-25 PROCEDURE — 99214 OFFICE O/P EST MOD 30 MIN: CPT | Performed by: PHYSICIAN ASSISTANT

## 2021-03-25 RX ORDER — FLUCONAZOLE 150 MG/1
150 TABLET ORAL ONCE
Qty: 1 TABLET | Refills: 0 | Status: SHIPPED | OUTPATIENT
Start: 2021-03-25 | End: 2021-03-25

## 2021-03-25 RX ORDER — FLUCONAZOLE 150 MG/1
150 TABLET ORAL
COMMUNITY
Start: 2021-03-24 | End: 2021-12-12

## 2021-03-25 RX ORDER — AMOXICILLIN 875 MG
875 TABLET ORAL 2 TIMES DAILY
Qty: 20 TABLET | Refills: 0 | Status: SHIPPED | OUTPATIENT
Start: 2021-03-25 | End: 2021-04-04

## 2021-03-25 ASSESSMENT — MIFFLIN-ST. JEOR: SCORE: 1714.95

## 2021-03-25 NOTE — PROGRESS NOTES
"    Assessment & Plan     Throat pain Positive strep throat  Strep throat test positive  Treat with amoxicillin  - Streptococcus A Rapid Scr w Reflx to PCR  - amoxicillin (AMOXIL) 875 MG tablet; Take 1 tablet (875 mg) by mouth 2 times daily for 10 days    Lymphadenitis  OTC motrin  Amoxicillin for infection  - amoxicillin (AMOXIL) 875 MG tablet; Take 1 tablet (875 mg) by mouth 2 times daily for 10 days    Candidiasis of vulva and vagina  Diflucan for vaginal yeast infection  - fluconazole (DIFLUCAN) 150 MG tablet; Take 1 tablet (150 mg) by mouth once for 1 dose    Review of external notes as documented elsewhere in note  Wet prep and treatment of candidal vaginitis       BMI:   Estimated body mass index is 29.85 kg/m  as calculated from the following:    Height as of this encounter: 1.74 m (5' 8.5\").    Weight as of this encounter: 90.4 kg (199 lb 3 oz).     No follow-ups on file.    Alvaro Casillas PA-C  [facility] TETE Melara is a 21 year old who presents for the following health issues     HPI     Sore throat, swollen glands  Yeast infection concern    Review of Systems   Constitutional, HEENT, cardiovascular, pulmonary, gi and gu systems are negative, except as otherwise noted.      Objective    /74   Pulse 85   Temp 97.8  F (36.6  C) (Tympanic)   Ht 1.74 m (5' 8.5\")   Wt 90.4 kg (199 lb 3 oz)   BMI 29.85 kg/m    Body mass index is 29.85 kg/m .  Physical Exam   GENERAL: healthy, alert and no distress  EYES: Eyes grossly normal to inspection, PERRL and conjunctivae and sclerae normal  HENT: ear canals and TM's normal, nose and mouth without ulcers or lesions, oropharynx clear, tonsillar hypertrophy, tonsillar erythema and tonsillar exudate  NECK: cervical adenopathy anterior bilaterally and thyroid normal to palpation  RESP: lungs clear to auscultation - no rales, rhonchi or wheezes  CV: regular rate and rhythm, normal S1 S2, no S3 or S4, no murmur, click or rub, no " peripheral edema and peripheral pulses strong  MS: no gross musculoskeletal defects noted, no edema  SKIN: no suspicious lesions or rashes  NEURO: Normal strength and tone, mentation intact and speech normal  PSYCH: mentation appears normal, affect normal/bright    Results for orders placed or performed in visit on 03/25/21   Streptococcus A Rapid Scr w Reflx to PCR     Status: Abnormal    Specimen: Throat   Result Value Ref Range    Strep Specimen Description Throat     Streptococcus Group A Rapid Screen Positive (A) NEG^Negative

## 2021-03-25 NOTE — LETTER
[facility]  [address] [address]  [phone]      March 25, 2021    RE:  Saritha Ferrera                                                                                                                                                       [address] [address]   [place] [address]            To whom it may concern:    Saritha Ferrera was seen in the urgent care today.  She may return to work on Sunday March 28th.        Sincerely,        Alvaro Casillas [facility] PA-C    [facility]

## 2021-04-07 ENCOUNTER — ALLIED HEALTH/NURSE VISIT (OUTPATIENT)
Dept: NURSING | Facility: CLINIC | Age: 21
End: 2021-04-07
Payer: COMMERCIAL

## 2021-04-07 DIAGNOSIS — Z30.42 ENCOUNTER FOR SURVEILLANCE OF INJECTABLE CONTRACEPTIVE: Primary | ICD-10-CM

## 2021-04-07 PROCEDURE — 96372 THER/PROPH/DIAG INJ SC/IM: CPT

## 2021-04-07 RX ADMIN — MEDROXYPROGESTERONE ACETATE 150 MG: 150 INJECTION, SUSPENSION INTRAMUSCULAR at 13:53

## 2021-04-07 NOTE — NURSING NOTE
Chief Complaint   Patient presents with     Imm/Inj     Depo     Clinic Administered Medication Documentation      Depo Provera Documentation    URINE HCG: not indicated    Depo-Provera Standing Order inclusion/exclusion criteria reviewed.   Patient meets: inclusion criteria     BP: Data Unavailable  LAST PAP/EXAM:   Lab Results   Component Value Date    PAP NIL 12/13/2019       Prior to injection, verified patient identity using patient's name and date of birth. Medication was administered. Please see MAR and medication order for additional information.     Was entire vial of medication used? Yes  Vial/Syringe: Single dose vial  Expiration Date:  9/30/2022    Patient instructed to remain in clinic for 15 minutes, report any adverse reaction to staff immediately  and stay in clinic after the injection but patient declined.  NEXT INJECTION DUE: 6/23/21 - 7/7/21      MARINA Devi

## 2021-06-30 ENCOUNTER — ALLIED HEALTH/NURSE VISIT (OUTPATIENT)
Dept: NURSING | Facility: CLINIC | Age: 21
End: 2021-06-30
Payer: COMMERCIAL

## 2021-06-30 DIAGNOSIS — Z30.9 ENCOUNTER FOR CONTRACEPTIVE MANAGEMENT, UNSPECIFIED TYPE: Primary | ICD-10-CM

## 2021-06-30 PROCEDURE — 96372 THER/PROPH/DIAG INJ SC/IM: CPT | Performed by: INTERNAL MEDICINE

## 2021-06-30 PROCEDURE — 99207 PR NO CHARGE NURSE ONLY: CPT

## 2021-06-30 RX ADMIN — MEDROXYPROGESTERONE ACETATE 150 MG: 150 INJECTION, SUSPENSION INTRAMUSCULAR at 11:02

## 2021-06-30 NOTE — PROGRESS NOTES
BP: Data Unavailable    LAST PAP/EXAM:   Lab Results   Component Value Date    PAP NIL 12/13/2019     URINE HCG:not indicated    The following medication was given:     MEDICATION: Depo Provera 150mg  ROUTE: IM  SITE: Ventrogluteal - Right  : amphastar  LOT #: [id number]  EXP:1/1/22  NEXT INJECTION DUE: 9/15/21 - 9/29/21   Dipti Sun CMA on 6/30/2021 at 11:05 AM

## 2021-09-04 ENCOUNTER — HEALTH MAINTENANCE LETTER (OUTPATIENT)
Age: 21
End: 2021-09-04

## 2021-09-29 ENCOUNTER — ALLIED HEALTH/NURSE VISIT (OUTPATIENT)
Dept: INTERNAL MEDICINE | Facility: CLINIC | Age: 21
End: 2021-09-29
Payer: COMMERCIAL

## 2021-09-29 DIAGNOSIS — Z30.42 ENCOUNTER FOR SURVEILLANCE OF INJECTABLE CONTRACEPTIVE: Primary | ICD-10-CM

## 2021-09-29 PROCEDURE — 96372 THER/PROPH/DIAG INJ SC/IM: CPT | Performed by: INTERNAL MEDICINE

## 2021-09-29 PROCEDURE — 99207 PR NO CHARGE NURSE ONLY: CPT

## 2021-09-29 RX ADMIN — MEDROXYPROGESTERONE ACETATE 150 MG: 150 INJECTION, SUSPENSION INTRAMUSCULAR at 16:40

## 2021-09-29 NOTE — NURSING NOTE
Clinic Administered Medication Documentation          Depo Provera Documentation    URINE HCG: not indicated    Depo-Provera Standing Order inclusion/exclusion criteria reviewed.   Patient meets: inclusion criteria     BP: Data Unavailable  LAST PAP/EXAM:   Lab Results   Component Value Date    PAP NIL 12/13/2019       Prior to injection, verified patient identity using patient's name and date of birth. Medication was administered. Please see MAR and medication order for additional information.     Was entire vial of medication used? Yes  Vial/Syringe: Single dose vial  Expiration Date:  04/01/2023    Patient instructed to remain in clinic for 15 minutes.  NEXT INJECTION DUE: 12/15/21 - 12/29/21    '

## 2021-12-07 ENCOUNTER — HOSPITAL ENCOUNTER (EMERGENCY)
Facility: CLINIC | Age: 21
Discharge: HOME | End: 2021-12-07
Attending: EMERGENCY MEDICINE | Admitting: EMERGENCY MEDICINE
Payer: COMMERCIAL

## 2021-12-07 ENCOUNTER — TELEPHONE (OUTPATIENT)
Dept: INTERNAL MEDICINE | Facility: CLINIC | Age: 21
End: 2021-12-07

## 2021-12-07 ENCOUNTER — PATIENT OUTREACH (OUTPATIENT)
Dept: INTERNAL MEDICINE | Facility: CLINIC | Age: 21
End: 2021-12-07

## 2021-12-07 VITALS
DIASTOLIC BLOOD PRESSURE: 59 MMHG | WEIGHT: 199 LBS | BODY MASS INDEX: 29.47 KG/M2 | SYSTOLIC BLOOD PRESSURE: 115 MMHG | RESPIRATION RATE: 16 BRPM | TEMPERATURE: 98.5 F | HEIGHT: 69 IN | HEART RATE: 108 BPM | OXYGEN SATURATION: 97 %

## 2021-12-07 VITALS
TEMPERATURE: 98.2 F | SYSTOLIC BLOOD PRESSURE: 116 MMHG | DIASTOLIC BLOOD PRESSURE: 78 MMHG | RESPIRATION RATE: 18 BRPM | OXYGEN SATURATION: 99 % | HEART RATE: 111 BPM

## 2021-12-07 DIAGNOSIS — F32.A DEPRESSION, UNSPECIFIED DEPRESSION TYPE: ICD-10-CM

## 2021-12-07 DIAGNOSIS — R45.851 SUICIDAL THOUGHTS: ICD-10-CM

## 2021-12-07 DIAGNOSIS — M54.50 ACUTE MIDLINE LOW BACK PAIN WITHOUT SCIATICA: ICD-10-CM

## 2021-12-07 DIAGNOSIS — F43.9 SITUATIONAL STRESS: ICD-10-CM

## 2021-12-07 DIAGNOSIS — M54.50 ACUTE BILATERAL LOW BACK PAIN WITHOUT SCIATICA: ICD-10-CM

## 2021-12-07 LAB
ALBUMIN UR-MCNC: NEGATIVE MG/DL
APPEARANCE UR: CLEAR
ATRIAL RATE - MUSE: 87 BPM
BILIRUB UR QL STRIP: NEGATIVE
COLOR UR AUTO: ABNORMAL
DIASTOLIC BLOOD PRESSURE - MUSE: NORMAL MMHG
ETHANOL SERPL-MCNC: <0.01 G/DL
GLUCOSE UR STRIP-MCNC: NEGATIVE MG/DL
HCG UR QL: NEGATIVE
HGB UR QL STRIP: NEGATIVE
HOLD SPECIMEN: NORMAL
HYALINE CASTS: 1 /LPF
INTERPRETATION ECG - MUSE: NORMAL
KETONES UR STRIP-MCNC: NEGATIVE MG/DL
LEUKOCYTE ESTERASE UR QL STRIP: NEGATIVE
MUCOUS THREADS #/AREA URNS LPF: PRESENT /LPF
NITRATE UR QL: NEGATIVE
P AXIS - MUSE: 46 DEGREES
PH UR STRIP: 6 [PH] (ref 5–7)
PR INTERVAL - MUSE: 166 MS
QRS DURATION - MUSE: 78 MS
QT - MUSE: 374 MS
QTC - MUSE: 450 MS
R AXIS - MUSE: 47 DEGREES
RBC URINE: 1 /HPF
SP GR UR STRIP: 1.01 (ref 1–1.03)
SQUAMOUS EPITHELIAL: 5 /HPF
SYSTOLIC BLOOD PRESSURE - MUSE: NORMAL MMHG
T AXIS - MUSE: 40 DEGREES
UROBILINOGEN UR STRIP-MCNC: NORMAL MG/DL
VENTRICULAR RATE- MUSE: 87 BPM
WBC URINE: <1 /HPF

## 2021-12-07 PROCEDURE — 81025 URINE PREGNANCY TEST: CPT | Performed by: EMERGENCY MEDICINE

## 2021-12-07 PROCEDURE — 250N000011 HC RX IP 250 OP 636: Performed by: EMERGENCY MEDICINE

## 2021-12-07 PROCEDURE — 36415 COLL VENOUS BLD VENIPUNCTURE: CPT | Performed by: EMERGENCY MEDICINE

## 2021-12-07 PROCEDURE — 81003 URINALYSIS AUTO W/O SCOPE: CPT | Performed by: EMERGENCY MEDICINE

## 2021-12-07 PROCEDURE — 96372 THER/PROPH/DIAG INJ SC/IM: CPT | Performed by: EMERGENCY MEDICINE

## 2021-12-07 PROCEDURE — 250N000013 HC RX MED GY IP 250 OP 250 PS 637: Performed by: EMERGENCY MEDICINE

## 2021-12-07 PROCEDURE — 82077 ASSAY SPEC XCP UR&BREATH IA: CPT | Performed by: EMERGENCY MEDICINE

## 2021-12-07 PROCEDURE — 99284 EMERGENCY DEPT VISIT MOD MDM: CPT | Mod: 25

## 2021-12-07 PROCEDURE — 99285 EMERGENCY DEPT VISIT HI MDM: CPT | Mod: 25

## 2021-12-07 PROCEDURE — 93005 ELECTROCARDIOGRAM TRACING: CPT

## 2021-12-07 PROCEDURE — 90791 PSYCH DIAGNOSTIC EVALUATION: CPT

## 2021-12-07 RX ORDER — ACETAMINOPHEN 500 MG
1000 TABLET ORAL ONCE
Status: COMPLETED | OUTPATIENT
Start: 2021-12-07 | End: 2021-12-07

## 2021-12-07 RX ORDER — KETOROLAC TROMETHAMINE 15 MG/ML
15 INJECTION, SOLUTION INTRAMUSCULAR; INTRAVENOUS ONCE
Status: COMPLETED | OUTPATIENT
Start: 2021-12-07 | End: 2021-12-07

## 2021-12-07 RX ORDER — LIDOCAINE 4 G/G
2 PATCH TOPICAL ONCE
Status: DISCONTINUED | OUTPATIENT
Start: 2021-12-07 | End: 2021-12-07 | Stop reason: HOSPADM

## 2021-12-07 RX ORDER — ACETAMINOPHEN 500 MG
500 TABLET ORAL ONCE
Status: COMPLETED | OUTPATIENT
Start: 2021-12-07 | End: 2021-12-07

## 2021-12-07 RX ORDER — HYDROXYZINE PAMOATE 25 MG/1
25 CAPSULE ORAL 3 TIMES DAILY PRN
Qty: 15 CAPSULE | Refills: 0 | Status: SHIPPED | OUTPATIENT
Start: 2021-12-07 | End: 2023-05-17

## 2021-12-07 RX ADMIN — ACETAMINOPHEN 500 MG: 500 TABLET ORAL at 18:41

## 2021-12-07 RX ADMIN — LIDOCAINE 2 PATCH: 560 PATCH PERCUTANEOUS; TOPICAL; TRANSDERMAL at 16:56

## 2021-12-07 RX ADMIN — KETOROLAC TROMETHAMINE 15 MG: 15 INJECTION, SOLUTION INTRAMUSCULAR; INTRAVENOUS at 16:56

## 2021-12-07 RX ADMIN — ACETAMINOPHEN 1000 MG: 500 TABLET, FILM COATED ORAL at 08:24

## 2021-12-07 ASSESSMENT — ENCOUNTER SYMPTOMS
DYSURIA: 0
HEADACHES: 1
HEADACHES: 1
SLEEP DISTURBANCE: 1
DIFFICULTY URINATING: 0
FREQUENCY: 0
WEAKNESS: 0
BACK PAIN: 1
HEMATURIA: 0
DIARRHEA: 0
CONSTIPATION: 0
HEMATURIA: 0
BACK PAIN: 1
FREQUENCY: 0
NUMBNESS: 0
DYSURIA: 0
DIFFICULTY URINATING: 0

## 2021-12-07 ASSESSMENT — MIFFLIN-ST. JEOR: SCORE: 1722.04

## 2021-12-07 NOTE — ED TRIAGE NOTES
Here for concern of suicidal thoughts, but no plans for couple weeks. Apartment cold and unable to sleep. Call mental health crisis and unable to get immediately help. Recent divorce, loss father and cat. Don't feel like she can trust herself at home. Also c/o back pain, dizziness, and headache. ABCs intact.

## 2021-12-07 NOTE — DISCHARGE INSTRUCTIONS
Thank you for allowing us to provided care today.      An appointment was set up for you to talk with someone who is specifically trained regarding medication options designed to alleviate depression and anxiety symptoms.  Please call them at the number below to confirm appointment specifics and insurance issues.        [facility]  [address] , [address] [place], [place]  [phone] 12/13/2021 2:00 pm    [facility] staff will be calling you to help with scheduling your own therapist as noted below.    For crisis availability at our two locations, call [facility] at [phone] anytime, 24-hours a day    If I am feeling unsafe or I am in a crisis, I will:   Contact my established care providers   Call the National Suicide Prevention Lifeline: 669.893.8453   Go to the nearest emergency room   Call 911   [facility] Crisis Line Number: [phone]       Warning signs that I or other people might notice when a crisis is developing for me:  I can work with new therapist to identify these warning signs and find tools and healthy ways to get in front of these.     Things I am able to do on my own to cope or help me feel better: Put myself and my needs first, then I can choose with whom I want to spend my time and how     Changes I can make to support my mental health and wellness: [facility] staff will be calling from [facility] () to help set up a new therapist for you who best meets those criteria that are both important to and comfortable for me     People in my life that I can ask for help: acknowledging this might be hard, I will determine who is healthy, supportive and trustworthy and start a dialogue when I am able     Your [facility] has a mental health crisis team you can call 24/7: [facility] Crisis Line Number: [phone]    Additional resource: Please utilize First call for help.  They can help connect me with many resources to include  "[employer] programs, employment support and much more.    They are also called \"211.\"  That is, some can call \"211\" and reach them.   Their local full phone number is           "
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
## 2021-12-07 NOTE — ED TRIAGE NOTES
Pt was at [facility] this am 0500 - for back pain and suicidal thoughts - pt represents with crying sporadically , c/o lower back pain, denies urinary symptoms , denies any injury . Pt then stated that her apt wasx too cold and she kept freezing so that's maybe why her back hurts.

## 2021-12-07 NOTE — TELEPHONE ENCOUNTER
What type of discharge? Emergency Department  Risk of Hospital admission or ED visit: 58%  Is a TCM episode required? No  When should the patient follow up with PCP? within 30 days of discharge.    Rosaura Peña RN  [facility]

## 2021-12-07 NOTE — ED PROVIDER NOTES
"  History   Chief Complaint:  Back Pain     The history is provided by the patient and medical records.      Saritha Ferrera is a 21 year old female with history of BRUNO, depression who presents with back pain. The patient reports that she began experiencing back pain that woke her up this morning, about 11.5 hours ago. She notes a migraine and difficulty ambulating due to her pain.  She notes that her apartment is \"unbearably cold,\" which she states may be contributing to her pain. The patient states that she took 2 Advil about 11.5 hours ago, as well as 2 Tylenol earlier today, which did not help. Of note, the patient presented to [facility] earlier today for suicidal ideation. She states that she is satisfied with the provided resources from the visit. The patient denies a history of other medical issues. She reports no recent falls or injuries. She states that she feels safe at home. The patient denies any suicidal or homicidal ideation at this time. She reports no urinary or bowel symptoms.     Review of Systems   Gastrointestinal: Negative for constipation and diarrhea.   Genitourinary: Negative for difficulty urinating, dysuria, frequency, hematuria and urgency.   Musculoskeletal: Positive for back pain.   Neurological: Positive for headaches.   Psychiatric/Behavioral: Negative for suicidal ideas.   All other systems reviewed and are negative.    Allergies:  The patient has no known allergies.     Medications:  Fluconazole  Hydroxyzine    Past Medical History:     Adjustment disorder  Migraine with aura  Depression  BRUNO  Eating disorder      Past Surgical History:    Tooth extraction     Family History:    Mother: Anorexia, depression, ovarian cancer  Sister: Anger issues, alcohol/drug abuse    Social History:  Presents to the emergency department alone  Lives in an apartment  History of cannabis abuse    Physical Exam     Patient Vitals for the past 24 hrs:   BP Temp Temp src Pulse Resp SpO2 Height " "Weight   12/07/21 1510 115/59 98.5  F (36.9  C) Temporal 108 16 97 % 1.753 m (5' 9\") 90.3 kg (199 lb)       Physical Exam  General: Patient in mild distress.  Alert and cooperative with exam. Normal mentation  HEENT: NC/AT. Conjunctiva without injection or scleral icterus. External ears normal.  Respiratory: Breathing comfortably on room air  CV: Normal rate, all extremities well perfused  GI:  Non-distended abdomen  Skin: Warm, dry, no rashes/open wounds on exposed skin  Musculoskeletal: No obvious deformities. Tenderness to lumbar paraspinal muscles without overlying skin change.  CMS intact to lower extremities  Neuro: Alert, answers questions appropriately. No gross motor deficits      Emergency Department Course     Emergency Department Course:    Reviewed:  I reviewed nursing notes, vitals and past medical history    Assessments:  1626 I obtained history and examined the patient as noted above.   1909 I rechecked the patient and explained findings.    Interventions:  1656 Toradol 15 mg Intramuscular  1656 Lidocaine 4% 2 patch Transdermal  1841 Tylenol 500 mg PO    Disposition:  The patient was discharged to home.     Impression & Plan     Medical Decision Making:  Saritha Ferrera is a 21 year old female who presents with back pain.  Pain has improved with interventions in the emergency department.  The patient did not sustain any trauma, therefore x-rays are not necessary due to the low likelihood of fracture or subluxation. The patient has not had a fever, saddle/perineal anesthesia, bilateral foot numbness, or bowel or bladder dysfunction.  She has no red flags in history of cancer or IVDU. There is no clinical evidence of cauda equina syndrome, discitis, spinal/epidural space hematoma or epidural abscess. The neurological exam is normal and the patient's symptoms are consistent with a musculoskeletal (myofascial) strain.  Discussed supportive care.  No heavy lifting, bending or twisting. Return if " increasing pain, numbness, weakness, or bowel or bladder dysfunction.  Follow-up with PCP in 1 week as needed    Diagnosis:    ICD-10-CM    1. Acute bilateral low back pain without sciatica  M54.50        Scribe Disclosure:  I, Kehinde Leyva, am serving as a scribe at 5:01 PM on 12/7/2021 to document services personally performed by Taqueria Theodore DO based on my observations and the provider's statements to me.              Taqueria Theodore DO  12/08/21 0010

## 2021-12-07 NOTE — ED PROVIDER NOTES
"  History   Chief Complaint:  Suicidal ideation     HPI   Saritha Ferrera is a 21 year old female with history of depression and anxiety who presents with increasing suicidal ideation, which has been a chronic issue for her. She called the mental health crisis hotline who was not able to find immediate help for her, prompting her evaluation here. She notes that she is able to cope with heat but this hasn't been working in her apartment so she has been having issues sleeping. The patient also states that she has gone through a break-up, lost her cat and her brother, and her mother is not around for support as she is an addict. The patient admits that she does not have a specific plan but has thoughts about cutting herself and throwing up \"just to feel something\". She states that she has a pencil in her bag and thought about harming herself while waiting in the lobby. The patient reports that she does have a history of suicidal attempts, last in May of 2020, via ingesting her medications. She has also been hospitalized in the past but states she never got beyond the assessment phase due to paranoia. Despite her mental health she also mentions some new back pain/pressure. Denies any numbness or tingling down her legs or urination changes. She also notes a headache which is improving. Does have a history of migraines. She has taken Advil x2 for her symptoms. Reports that she does drink alcohol, last drink of a single unfinished white claw around 1800 last night. While drinking she became lightheaded causing her to leave the bar. Also uses marijuana. Adds that she is on birth control and is no longer sexually active so denies chance of pregnancy. She is not vaccinated for Covid-19.      Review of Systems   Genitourinary: Negative for decreased urine volume, difficulty urinating, dysuria, frequency, hematuria and urgency.   Musculoskeletal: Positive for back pain.   Neurological: Positive for headaches. Negative for " weakness and numbness.   Psychiatric/Behavioral: Positive for sleep disturbance and suicidal ideas.   All other systems reviewed and are negative.      Allergies:  The patient has no known allergies.     Medications:  Spirolactone  Depo-Provera    Past Medical History:     Migraines with aura  Depression  Anxiety  Cannabis abuse  Eating disorder    Past Surgical History:    Tooth extraction    Family History:    Mother: Anorexia and depression, ovarian cancer  Sister: Alcohol/drug, anger issues    Social History:  The patient presents to the ED alone.  Alcohol and marijuana use.      Physical Exam     Patient Vitals for the past 24 hrs:   BP Temp Temp src Pulse Resp SpO2   12/07/21 0709 116/78 98.2  F (36.8  C) Oral 111 18 99 %       Physical Exam  General: Adult female sitting upright  Eyes: PERRL, Conjunctive within normal limits. No scleral icterus.  ENT: Moist mucous membranes, oropharynx clear.   CV: Normal S1S2, no murmur, rub or gallop. Regular rate and rhythm  Resp: Clear to auscultation bilaterally, no wheezes, rales or rhonchi. Normal respiratory effort.  GI: Abdomen is soft, nontender and nondistended. No palpable masses. No rebound or guarding.  MSK: No edema. Nontender. Normal active range of motion.  Skin: Warm and dry. No rashes or lesions or ecchymoses on visible skin.  Neuro: Alert and oriented. Responds appropriately to all questions and commands. No focal findings appreciated. Normal muscle tone.  Psych: Normal mood and affect. Pleasant.    Emergency Department Course   ECG  ECG obtained at 0832, ECG read at 0836  Normal sinus rhythm with sinus arrhythmia. Normal ECG.   Rate 87 bpm. MO interval 166 ms. QRS duration 78 ms. QT/QTc 374/450 ms. P-R-T axes 46 47 40.     Laboratory:  Labs Ordered and Resulted from Time of ED Arrival to Time of ED Departure   ROUTINE UA WITH MICROSCOPIC REFLEX TO CULTURE - Abnormal       Result Value    Color Urine Light Yellow      Appearance Urine Clear      Glucose  Urine Negative      Bilirubin Urine Negative      Ketones Urine Negative      Specific Gravity Urine 1.014      Blood Urine Negative      pH Urine 6.0      Protein Albumin Urine Negative      Urobilinogen Urine Normal      Nitrite Urine Negative      Leukocyte Esterase Urine Negative      Mucus Urine Present (*)     RBC Urine 1      WBC Urine <1      Squamous Epithelials Urine 5 (*)     Hyaline Casts Urine 1     HCG QUALITATIVE URINE - Normal    hCG Urine Qualitative Negative     ETHYL ALCOHOL LEVEL - Normal    Alcohol ethyl <0.01        Emergency Department Course:    Mental Health Risk Assessment      PSS-3    Date and Time Over the past 2 weeks have you felt down, depressed, or hopeless? Over the past 2 weeks have you had thoughts of killing yourself? Have you ever attempted to kill yourself? When did this last happen? User   12/07/21 0711 yes yes yes more than 6 months ago CT      C-SSRS ([place])    Date and Time Q1 Wished to be Dead (Past Month) Q2 Suicidal Thoughts (Past Month) Q3 Suicidal Thought Method Q4 Suicidal Intent without Specific Plan Q5 Suicide Intent with Specific Plan Q6 Suicide Behavior (Lifetime) Within the Past 3 Months? RETIRED: Level of Risk per Screen Screening Not Complete User   12/07/21 0814 yes yes yes no yes yes -- -- -- JN              Suicide assessment completed by mental health (D.E.C., LCSW, etc.)    Reviewed:  I reviewed nursing notes, vitals, past medical history and Care Everywhere    Assessments:  0754 I obtained history and examined the patient as noted above.     0937 I rechecked the patient and explained findings. She denies any new concerns. She feels comfortable.  She notes she is in agreement with the plan, and currently feels safe to go home.    Consults:  0940 I spoke with DEC regarding patient's presentation and assessment.     Interventions:  0824 Tylenol 1000 mg PO    Disposition:  The patient was discharged to home.     Impression & Plan     Medical Decision  Making:  Saritha Ferrera is a 21 year old female who presents for evaluation of chronic suicidal ideation, worse recently, with multiple situational stressors.  They have a history of previous psychiatric illness. The patient has had increasing thoughts of self harm.  She does not have a plan.  Due to her concerns,DEC assessment was obtained.  She is able to contract for safety and currently does feel safe going home.  Although the patient has had increased suicidal thoughts, they have not had any actions of self harm.  She physically denies any concerns aside from low back pain and some dizziness the night before.  The low back pain seems unlikely to represent acute surgical or neurologic emergency.  No findings on examination to suggest a worrisome cause.  OTC analgesics seems reasonable start.  Advanced imaging does not seem necessary at this time.  The patient was seen by DEC and is able to contract for safety and has a plan in place, including expedited follow-up with both therapy and psychiatry for medical management. The patient and DEC are comfortable with the plan for discharge. The patient was given resources and will follow up as instructed.  Recommended as needed follow-up for the back pain if it persists over the next week.  She can return to the emergency department anytime should she feel unsafe, increasing thoughts of self-harm, increasing back pain, numbness tingling or weakness in extremities, etc.    Diagnosis:    ICD-10-CM    1. Suicidal thoughts  R45.851    2. Depression, unspecified depression type  F32.A    3. Situational stress  F43.9    4. Acute midline low back pain without sciatica  M54.50        Discharge Medications:  New Prescriptions    HYDROXYZINE (VISTARIL) 25 MG CAPSULE    Take 1 capsule (25 mg) by mouth 3 times daily as needed for anxiety       Scribe Disclosure:  Alberto ONEILL, am serving as a scribe at 7:56 AM on 12/7/2021 to document services personally performed by Neda  Latesha Hernandez MD based on my observations and the provider's statements to me.               Latesha Red MD  12/07/21 3977

## 2021-12-07 NOTE — ED NOTES
Patient changed into scrubs, belongings put in large bag sealed with duck tape and has sticker with name on it. Given to Ana Virgen RN when moved to room 4

## 2021-12-07 NOTE — TELEPHONE ENCOUNTER
ED / Discharge Outreach Protocol    Patient Contact    Attempt # 1    Was call answered?  No.  Left message on voicemail with information to call me back.

## 2021-12-07 NOTE — TELEPHONE ENCOUNTER
Pt calling states she was just seen in the [facility] ED for back pain.  Was advised to use otc ibuprofen and tylenol. States she took tylenol and then vomited.  States the pain is worse rating it 10/10.    Advised she needs to go back to the ED for re-evaluation.  Pt states understanding.    Salome MACKEY RN  EP Triage

## 2021-12-08 ENCOUNTER — NURSE TRIAGE (OUTPATIENT)
Dept: NURSING | Facility: CLINIC | Age: 21
End: 2021-12-08
Payer: COMMERCIAL

## 2021-12-08 ENCOUNTER — PATIENT OUTREACH (OUTPATIENT)
Dept: CARE COORDINATION | Facility: CLINIC | Age: 21
End: 2021-12-08
Payer: COMMERCIAL

## 2021-12-08 DIAGNOSIS — Z71.89 OTHER SPECIFIED COUNSELING: ICD-10-CM

## 2021-12-08 NOTE — TELEPHONE ENCOUNTER
"RN Triage:    Was seen in two ER's yesterday for suicidal ideation and lower back pain.  Calling today because she can't remember all the things they told her to do after discharge, and can't really read the discharge papers right now.  \"My back pain is interfering with my ability to think.\"  Is not feeling suicidal now.  Lower back pain is at 7-8/10.  History of depression for which she has gone through the \"[facility]\" program.  Recent break up with boyfriend and her brother's murder 1 year ago is contributing to depression/stress.  Does not have a current counselor.  Is not on any medications.  Does not have a good support system.  Lives alone, but her mother and father will help her on occasion.  Was set up with Care Coordination yesterday at the hospital, but missed a call from there today.  Home care discussed for back pain and depression.  She plans to get the prescribed vistaril at pharmacy, use ice for back pain and call the Care Coordinator back now.  Phone number for Care Coordinator given to Saritha.  She will call triage back as needed.    Dee Samaniego RN 12/08/21 3:00 PM  [facility] Nurse Advisor          Reason for Disposition    Back pain    Depression is worsening (e.g.,sleeping poorly, less able to do activities of daily living)    Additional Information    Negative: Passed out (i.e., fainted, collapsed and was not responding)    Negative: Shock suspected (e.g., cold/pale/clammy skin, too weak to stand, low BP, rapid pulse)    Negative: Sounds like a life-threatening emergency to the triager    Negative: Major injury to the back (e.g., MVA, fall > 10 feet or 3 meters, penetrating injury, etc.)    Negative: Pain in the upper back over the ribs (rib cage) that radiates (travels) into the chest    Negative: Pain in the upper back over the ribs (rib cage) and worsened by coughing (or clearly increases with breathing)    Negative: SEVERE back pain of sudden onset and age > 60    Negative: SEVERE " abdominal pain (e.g., excruciating)    Negative: Abdominal pain and age > 60    Negative: Unable to urinate (or only a few drops) and bladder feels very full    Negative: Loss of bladder or bowel control (urine or bowel incontinence; wetting self, leaking stool) of new onset    Negative: Numbness (loss of sensation) in groin or rectal area    Negative: Pain radiates into groin, scrotum    Negative: Blood in urine (red, pink, or tea-colored)    Negative: Vomiting and pain over lower ribs of back (i.e., flank - kidney area)    Negative: Weakness of a leg or foot (e.g., unable to bear weight, dragging foot)    Negative: Patient sounds very sick or weak to the triager    Negative: Fever > 100.4 F (38.0 C) and flank pain    Negative: Pain or burning with passing urine (urination)    Negative: SEVERE back pain (e.g., excruciating, unable to do any normal activities) and not improved after pain medicine and CARE ADVICE    Negative: Numbness in an arm or hand (i.e., loss of sensation) and upper back pain    Negative: Numbness in a leg or foot (i.e., loss of sensation)    Negative: High-risk adult (e.g., history of cancer, history of HIV, or history of IV drug abuse)    Negative: Painful rash with multiple small blisters grouped together (i.e., dermatomal distribution or 'band' or 'stripe')    Negative: Pain radiates into the thigh or further down the leg, and in both legs    Negative: Age > 50 and no history of prior similar back pain    Negative: MODERATE back pain (e.g., interferes with normal activities) and present > 3 days    Negative: Pain radiates into the thigh or further down the leg    Negative: Patient wants to be seen    Negative: Back pain lasts > 2 weeks    Negative: Back pain is a chronic symptom (recurrent or ongoing AND lasting > 4 weeks)    Negative: Patient attempted suicide    Negative: Patient is threatening suicide now    Negative: Violent behavior, or threatening to physically hurt or kill someone     Negative: Patient is very confused (disoriented, slurred speech) and no other adult (e.g., friend or family member) available    Negative: Difficult to awaken or acting very confused (disoriented, slurred speech) and new onset    Negative: Sounds like a life-threatening emergency to the triager    Negative: Alcohol use, abuse or dependence, question or problem related to    Negative: Drug abuse or dependence, question or problem related to    Negative: Suicide thoughts, threats, attempts, or questions    Negative: Anxiety is main problem or symptom    Negative: Depression and unable to do any of normal activities (e.g., self care, school, work; in comparison to baseline).    Negative: Very strange or confused behavior    Negative: Patient sounds very sick or weak to the triager    Negative: Fever > 101 F (38.3 C)    Negative: Sometimes has thoughts of suicide    Negative: Symptoms interfere with work or school    Protocols used: BACK PAIN-A-OH, DEPRESSION-A-OH

## 2021-12-08 NOTE — DISCHARGE INSTRUCTIONS
4% lidocaine patch (available over-the-counter) as needed for pain control; use as directed    Return to the emergency department or seek medical care as instructed if your symptoms fail to improve or significantly worsen.    Take Acetaminophen (aka Tylenol) and/or ibuprofen (aka Motrin/Advil, 600mg up to 4 times per day with food) as needed for symptom/pain relief; use as directed.    Ice area of pain for 20 minutes four times per day for the next two days    Follow-up as indicated on page 1.  Maintain adequate hydration and get plenty of rest.   Follow-up with previously provided mental health resources

## 2021-12-08 NOTE — PROGRESS NOTES
Clinic Care Coordination Contact  [facility]/Voicemail       Clinical Data: Care Coordinator Outreach  Outreach attempted x 1.  Left message on patient's voicemail with call back information and requested return call.  Plan: Care Coordinator will try to reach patient again in 1-2 business days.    LESLIE Vargas  [phone]  [facility]

## 2021-12-09 NOTE — PROGRESS NOTES
"Clinic Care Coordination Contact  [facility]: Post-Discharge Note  SITUATION                                                      Admission:    Admission Date: 12/07/21   Reason for Admission: Acute bilateral low back pain without sciatica  Discharge:   Discharge Date: 12/07/21  Discharge Diagnosis: Acute bilateral low back pain without sciatica    BACKGROUND                                                      Back Pain      The history is provided by the patient and medical records.      Saritha Ferrera is a 21 year old female with history of BRUNO, depression who presents with back pain. The patient reports that she began experiencing back pain that woke her up this morning, about 11.5 hours ago. She notes a migraine and difficulty ambulating due to her pain.  She notes that her apartment is \"unbearably cold,\" which she states may be contributing to her pain. The patient states that she took 2 Advil about 11.5 hours ago, as well as 2 Tylenol earlier today, which did not help. Of note, the patient presented to [facility] earlier today for suicidal ideation. She states that she is satisfied with the provided resources from the visit. The patient denies a history of other medical issues. She reports no recent falls or injuries. She states that she feels safe at home. The patient denies any suicidal or homicidal ideation at this time. She reports no urinary or bowel symptoms.      Review of Systems   Gastrointestinal: Negative for constipation and diarrhea.   Genitourinary: Negative for difficulty urinating, dysuria, frequency, hematuria and urgency.   Musculoskeletal: Positive for back pain.   Neurological: Positive for headaches.   Psychiatric/Behavioral: Negative for suicidal ideas.   All other systems reviewed and are negative.     Allergies:  The patient has no known allergies.      Medications:  Fluconazole  Hydroxyzine     Past Medical History:     Adjustment disorder  Migraine with " aura  Depression  BRUNO  Eating disorder       Past Surgical History:    Tooth extraction      Family History:    Mother: Anorexia, depression, ovarian cancer  Sister: Anger issues, alcohol/drug abuse     Social History:  Presents to the emergency department alone  Lives in an apartment  History of cannabis abuse    Medical Decision Making:  Saritha Ferrera is a 21 year old female who presents with back pain.  Pain has improved with interventions in the emergency department.  The patient did not sustain any trauma, therefore x-rays are not necessary due to the low likelihood of fracture or subluxation. The patient has not had a fever, saddle/perineal anesthesia, bilateral foot numbness, or bowel or bladder dysfunction.  She has no red flags in history of cancer or IVDU. There is no clinical evidence of cauda equina syndrome, discitis, spinal/epidural space hematoma or epidural abscess. The neurological exam is normal and the patient's symptoms are consistent with a musculoskeletal (myofascial) strain.  Discussed supportive care.  No heavy lifting, bending or twisting. Return if increasing pain, numbness, weakness, or bowel or bladder dysfunction.  Follow-up with PCP in 1 week as needed       ASSESSMENT      Enrollment  Primary Care Care Coordination Status: Potential    Discharge Assessment  How are you doing now that you are home?: Pt reports she is still experieincing some back pain. She does not think this will be pernant, she has reach out to her dad so he can assist her with picking up her remaining medication to address the main. Pt inquired about support options. Pt says that having to stay home as a result of her back pain has exacerbated her depression and anxiety. She reports she recently went throuh the passing of her brother as well as got out of a toxic relationship and is interested in a support group. Pt was diagnosed with depression and anxiety with [facility] in the past. Pt was agreeable  to TAYLOR scheduing Pt for a CCC assessment with Annika . TAYLOR avendaño scheduled post hospital follow up visit with Pt's PCP. TAYLOR sent email to Pt with free resources for group support including a zoom options through [employer], in person options through Depression [facility] [place] [facility] and a phone option with [place] [facility]. TAYLOR provided a detailed explanation for was CCC was a Pt was interested in continuing to follow up with clinic SW. Pt declined having further questions at this this time.  How are your symptoms? (Red Flag symptoms escalate to triage hotline per guidelines): Unchanged  Do you feel your condition is stable enough to be safe at home until your provider visit?: Yes  Does the patient have their discharge instructions? : Yes  Does the patient have questions regarding their discharge instructions? : No  Were you started on any new medications or were there changes to any of your previous medications? : Yes  Does the patient have all of their medications?: No (see comment) (Father will  remaining.)  Do you have questions regarding any of your medications? : No  Do you have all of your needed medical supplies or equipment (DME)?  (i.e. oxygen tank, CPAP, cane, etc.): Yes  Discharge follow-up appointment scheduled within 14 calendar days? : Yes  Discharge Follow Up Appointment Date: 12/16/21  Discharge Follow Up Appointment Scheduled with?: Primary Care Provider               PLAN                                                      Outpatient Plan:  Follow up with Vita Ren MD in 1 week  (around 12/14/2021)    Future Appointments   Date Time Provider Department Center   12/10/2021  2:00 PM [facility]   12/16/2021 11:00 AM Vita Ren MD [facility]         For any urgent concerns, please contact our 24 hour nurse triage line: [phone] ([phone])         LESLIE Vargas  [phone]  [facility]  [place]

## 2021-12-10 ENCOUNTER — NURSE TRIAGE (OUTPATIENT)
Dept: NURSING | Facility: CLINIC | Age: 21
End: 2021-12-10

## 2021-12-10 NOTE — TELEPHONE ENCOUNTER
"Saritha reports that she was feeling anxious earlier.  \"I was having bad thoughts but the call with the suicide hotline helped a lot.\"    No suicidal ideation at the time of call.  States that she has been calling the National Suicide Prevention Hotline for 3 nights in a row and did good with counseling. States that temporary coping mechanisms helped her.  Took Vistaril for anxiety, which helped with her anxious thoughts but is now wide awake and unable to sleep. First time to take Vistaril.    She is concerned that she might fall asleep and miss her  appointment at 2 PM.    Appointment with PCP on 12/16    Per protocol, advised that message will be sent to PCP and SW that she made this call tonight re: insomnia and anxiety. Patient verbalizes understanding. Advised to call back with further questions/concerns.     To Care team: Do you recommend to have pt continue Vistaril PRN?    Kelly Bennett RN/[facility] Nurse Advisor              Reason for Disposition    Symptoms interfere with sleep    Additional Information    Negative: Severe difficulty breathing (e.g., struggling for each breath, speaks in single words)    Negative: Bluish (or gray) lips or face now    Negative: Difficult to awaken or acting confused (e.g., disoriented, slurred speech)    Negative: Hysterical or combative behavior    Negative: Sounds like a life-threatening emergency to the triager    Negative: [1] Difficulty breathing AND [2] persists > 10 minutes AND [3] not relieved by reassurance provided by triager    Negative: [1] Lightheadedness or dizziness AND [2] persists > 10 minutes AND [3] not relieved by reassurance provided by triager    Negative: [1] Alcohol or drug abuse, known or suspected AND [2] feeling very shaky (i.e., visible tremors of hands)    Negative: Patient sounds very sick or weak to the triager    Negative: Symptoms interfere with work or school    Negative: Requesting to talk to a counselor (e.g., mental health " worker, psychiatrist)    Negative: Patient sounds very upset or troubled to the triager    Negative: [1] Symptoms of anxiety or panic AND [2] has not been evaluated for this by physician    Negative: [1] Started on anti-anxiety medication AND [2] no relief    Negative: [1] Significant weight loss (or gain) AND [2] not dieting    Negative: Taking thyroid medications    Negative: Known or suspected caffeine abuse (e.g., > 2 cups of coffee/tea or > 4 cans of soda / day)    Negative: Alcohol or drug abuse, known or suspected    Negative: Taking herbal remedies    Negative: Recent traumatic event (e.g., death of a loved one, job loss, victim/witness of crime)    Protocols used: ANXIETY AND PANIC ATTACK-A-AH

## 2021-12-12 ENCOUNTER — HOSPITAL ENCOUNTER (OUTPATIENT)
Facility: CLINIC | Age: 21
Setting detail: OBSERVATION
Discharge: HOME | End: 2021-12-13
Attending: EMERGENCY MEDICINE | Admitting: PSYCHIATRY & NEUROLOGY
Payer: COMMERCIAL

## 2021-12-12 DIAGNOSIS — F33.2 SEVERE EPISODE OF RECURRENT MAJOR DEPRESSIVE DISORDER, WITHOUT PSYCHOTIC FEATURES (H): ICD-10-CM

## 2021-12-12 DIAGNOSIS — F41.1 GAD (GENERALIZED ANXIETY DISORDER): ICD-10-CM

## 2021-12-12 LAB — SARS-COV-2 RNA RESP QL NAA+PROBE: NEGATIVE

## 2021-12-12 PROCEDURE — 87635 SARS-COV-2 COVID-19 AMP PRB: CPT | Performed by: EMERGENCY MEDICINE

## 2021-12-12 PROCEDURE — C9803 HOPD COVID-19 SPEC COLLECT: HCPCS

## 2021-12-12 PROCEDURE — 99285 EMERGENCY DEPT VISIT HI MDM: CPT | Mod: 25

## 2021-12-12 PROCEDURE — 90791 PSYCH DIAGNOSTIC EVALUATION: CPT

## 2021-12-12 RX ORDER — SPIRONOLACTONE 100 MG/1
100 TABLET, FILM COATED ORAL DAILY
COMMUNITY
End: 2021-12-13

## 2021-12-13 ENCOUNTER — TELEPHONE (OUTPATIENT)
Dept: BEHAVIORAL HEALTH | Facility: CLINIC | Age: 21
End: 2021-12-13

## 2021-12-13 VITALS
DIASTOLIC BLOOD PRESSURE: 85 MMHG | BODY MASS INDEX: 29.53 KG/M2 | RESPIRATION RATE: 16 BRPM | SYSTOLIC BLOOD PRESSURE: 121 MMHG | HEART RATE: 86 BPM | OXYGEN SATURATION: 98 % | TEMPERATURE: 98.8 F | WEIGHT: 200 LBS

## 2021-12-13 PROBLEM — R45.89 SUICIDAL BEHAVIOR WITHOUT ATTEMPTED SELF-INJURY: Status: ACTIVE | Noted: 2021-12-13

## 2021-12-13 PROBLEM — F33.2 SEVERE EPISODE OF RECURRENT MAJOR DEPRESSIVE DISORDER, WITHOUT PSYCHOTIC FEATURES (H): Status: ACTIVE | Noted: 2021-12-13

## 2021-12-13 PROBLEM — F43.10 PTSD (POST-TRAUMATIC STRESS DISORDER): Status: ACTIVE | Noted: 2021-12-13

## 2021-12-13 PROCEDURE — G0378 HOSPITAL OBSERVATION PER HR: HCPCS

## 2021-12-13 PROCEDURE — 99236 HOSP IP/OBS SAME DATE HI 85: CPT | Performed by: PSYCHIATRY & NEUROLOGY

## 2021-12-13 RX ORDER — BUPROPION HYDROCHLORIDE 150 MG/1
150 TABLET ORAL EVERY MORNING
Qty: 30 TABLET | Refills: 0 | Status: SHIPPED | OUTPATIENT
Start: 2021-12-13 | End: 2023-05-17

## 2021-12-13 RX ORDER — PROPRANOLOL HCL 60 MG
60 CAPSULE, EXTENDED RELEASE 24HR ORAL DAILY
Qty: 30 CAPSULE | Refills: 0 | Status: SHIPPED | OUTPATIENT
Start: 2021-12-13

## 2021-12-13 RX ORDER — ESCITALOPRAM OXALATE 10 MG/1
10 TABLET ORAL DAILY
Qty: 30 TABLET | Refills: 0 | Status: SHIPPED | OUTPATIENT
Start: 2021-12-13 | End: 2023-05-17

## 2021-12-13 ASSESSMENT — ACTIVITIES OF DAILY LIVING (ADL)
DRESS: SCRUBS (BEHAVIORAL HEALTH)
ORAL_HYGIENE: INDEPENDENT
HYGIENE/GROOMING: HANDWASHING

## 2021-12-13 NOTE — DISCHARGE INSTRUCTIONS
To access your revoPT account, go to [email or web address] and reset your password.    For learning about DBT skills go to [email or web address] - watch a short video, practice and complete lessons    For mindfulness training go to [facility]Cloud Theory and learn about how you can use mindfulness to calm and care for yourself    Aftercare Plan      [employer] - Candie Cheshire  [email or web address]  [address], [place] [address]   [phone]    For crisis residence availability call Central Access at [phone] anytime, 24-hours a day   [facility]  ([place]) [phone]  [facility] ( [facility]) [phone]  [facility] ([place]) [phone]  [facility]) [phone]      If I am feeling unsafe or I am in a crisis, I will:   Contact my established care providers   Call the National Suicide Prevention Lifeline: [phone]   Go to the nearest emergency room   Call 911   [facility] Crisis Line Number: [phone]        Warning signs that I or other people might notice when a crisis is developing for me:  I can work with new therapist to identify these warning signs and find tools and healthy ways to get in front of these.      Things I am able to do on my own to cope or help me feel better: Put myself and my needs first, then I can choose with whom I want to spend my time and how      Changes I can make to support my mental health and wellness: therapy and [facility] worker      People in my life that I can ask for help: acknowledging this might be hard, I will determine who is healthy, supportive and trustworthy and start a dialogue when I am able      Your [facility] has a mental health crisis team you can call 24/7: [facility] Crisis Line Number: [phone]     Additional resource: Please utilize First call for help.  They can help connect me with many resources to include [employer] [facility]  "Stabilizaton programs, employment support and much more.    They are also called \"211.\"  That is, some can call \"211\" and reach them.   Their local full phone number is     SKILL: self soothing  Below there are numerous suggestions about how you can engage your five different senses. Go through each of them and think about which ones of them you would find beneficial and enjoyable. The goal of these activities is to reach a state of relaxation, where you would be able to think and behave more effectively. Being calm and relaxed is also important because it affects our physical health in a very positive way.  1 Sense of Sight  1. Go to nature (it can be a park nearby) and look at the beautiful way how the judy, the trees, the grass, the benches and the people make a collage of colors and life.  2. Find pictures on the Internet of places that you find soothing to look at. It can be different cities, nature, or artistic images and pictures.  3. Go to a museum or a gallery and enjoy the visual art presented there.  4. Watch a movie that is famous for its beautiful cinematography (like \"2001 Space Odyssey\"). Make sure that its story won't be something that will be difficult for you to watch.  5. Start a collection of pictures that you find pleasurable and soothing to look at, and look at them when needed.  2 Sense of Hearing  1. Talk to a person that you like and whose voice makes you happy or you enjoy hearing.  2. Listen to your favorite music that usually makes you more relaxed.  3. Listen to soothing jazz, or instrumental live radio on [employer]. You might also enjoy classical music, opera or new age music (like the music of Dionne).  4. Go to a park nearby and enjoy the liveliness of sounds around you (birds, wind, people chattering).  5. If you play a musical instrument or sing in your free time, you can try to do that.  6. Listen to an audio book, a podcast or a TV show that you enjoy and see if it makes " you feel more relaxed.  3 Sense of Smell  1. Wear a perfume or cologne whose smell you enjoy.  2. Light up a scented candle in your room.  3. Cook a meal that smells delicious to you.  4. Buy some flowers or indoor plants that you would enjoy smelling.  5. Hug a person you love whose smell makes you feel calm.  6. Go someplace where you enjoy the scent (flower shop, perfume shop, restaurant, bakery)  4 Sense of Taste  1. Cook your favorite meal, eat it slowly and savor its taste.  2. Go to your favorite place to eat and buy your favorite meal.  3. Get some snacks or comfort food (chocolate, ice cream, potato chips) and enjoy it (but don't overdo it).  4. Make yourself a cup of coffee, tea, cocoa or anything else that you enjoy drinking (avoid alcohol).  5. Eat a fresh piece of fruit and enjoy its taste.  6. Chew gum or eat some sweets.  5 Sense of Touch  1. Take your favorite, soft blanket and wrap yourself or simply enjoy how it feels on your skin.  2. Pet your animal and hold it in your lap.  3. Wear comfortable clothes and enjoy how it feels on your skin.  4. Take a shower or a bubble bath and enjoy the warm and soothing water. You can also take a cold shower if you find that more suitable.  5. Get a massage or if you don't have the time, you can massage yourself.  6. Touch something smooth, velvety or fluffy.  From the public domain: Self Soothing : DBT ([email or web address])
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
## 2021-12-13 NOTE — PLAN OF CARE
Patient was alert and awake on lounge chair and requested to move to Sensory  Room C. Verbalizes feeling safe in the hospital and denies pain. Recently has been experiencing an increase in back pain and not being able to sleep at night. Continue to monitor safety closely.

## 2021-12-13 NOTE — ED NOTES
[facility] Reassessment and Progress Note    Client Name: Saritha Ferrera  Date: December 13, 2021       Presenting issue that brought patient to the emPATH unit:   Patient presented to the ED alone and was referred to the ED by self. Patient is presenting to the ED for the following concerns: pt states that she has been struggling to take care of herself. She states she had a SI plan to overdose on her medication. She states that she called [facility] for support and was referred to [facility] ED.     Current presentation on the unit:   Pt reportedly had difficulty sleeping and was still resting when writer approached for reassessment. Pt remains somewhat tearful, she is behaviorally calm and appropriate. Pt insight is poor, external locus of control, poor coping, she is engaged in the session with a guarded affect and identifies barriers to adaptive strategies and planning for how to manage emotions independently. Pt affect is blunted, she verbalizes helplessness and feeling alone, is unable to identify social support, reports family are not supportive. Pt is unable to identify known coping strategies apart from snapping band on wrist. Pt and writer practice breathing and grounding exercises and plan for increasing outpatient supports. Provided psychoeducation regarding DBT and trauma treatment, explored levels of care available outpatient.      Current risk to self or others? No    Summary of therapeutic interventions completed with patient: crisis assessment, psychiatric evaluation, brief therapy, skills training, crisis and safety planning, assistance with access to outpatient resources    Treatment objectives addressed in this session:   Explore the impact of depression and anxiety, identify strategies for improving distress tolerance    Progress on treatment goals: crisis resolved     Additional collateral information:   Additional resources provided in AVS, seen at the end of this note     Mental  Status:     Appearance:   Appropriate  Disheveled    Eye Contact:   Fair    Psychomotor Behavior: Normal    Attitude:   Guarded    Orientation:   All   Speech    Rate / Production: responsive    Volume:  Normal    Mood:    Depressed    Affect:    Blunted sad   Thought Content:  Clear    Thought Form:  Coherent  Goal Directed    Insight:    Fair       Plan: pt is restarted on medications which have been helpful in the past. Pt is scheduled with follow up medication management 12/20, intake for PHP 12/30 and pt will determine at that time if she is able to participate in the intensity of PHP; pt is scheduled for intake to individual therapy 1/5/22 and will have [facility] bridging therapy for the weeks she is waiting for intakes.     Pt appointments provided and a reminder is mailed with appointment instructions.     Disposition: Individual therapy , Medication management and Programmatic care: PHP referral in process, intake scheduled 12/30    Rationale for disposition: pt is able to maintain her own safety and agrees she will attend outpatient appointments to get her mental health care back on track    Reviewed assessment with attending provider: Dr Baljit Lopez     Total time spent with patient:.50 hrs     CPT code: 14668 - Psychotherapy (with patient) - 30 (16-37*) min      MOLLY Haynes     To access your Punchd account, go to [email or web address] and reset your password.    For learning about DBT skills go to [email or web address] - watch a short video, practice and complete lessons    For mindfulness training go to [email or web address] and learn about how you can use mindfulness to calm and care for yourself    Aftercare Plan      [employer] - Candie Bergen  [email or web address]  [address], [place], [place] [address] [phone]    For crisis residence availability call Central Access at [phone] anytime, 24-hours a day   [facility]  "[facility]  ([place]) [phone]  [facility] ( [facility] [phone]  [facility] ([place]) [phone]  [facility] ([facility] [phone]      If I am feeling unsafe or I am in a crisis, I will:   Contact my established care providers   Call the National Suicide Prevention Lifeline: 295.838.9848   Go to the nearest emergency room   Call 911   [facility] Line Number: [phone]        Warning signs that I or other people might notice when a crisis is developing for me:  I can work with new therapist to identify these warning signs and find tools and healthy ways to get in front of these.      Things I am able to do on my own to cope or help me feel better: Put myself and my needs first, then I can choose with whom I want to spend my time and how      Changes I can make to support my mental health and wellness: therapy and [facility] worker      People in my life that I can ask for help: acknowledging this might be hard, I will determine who is healthy, supportive and trustworthy and start a dialogue when I am able      Your [facility] has a mental health crisis team you can call 24/7: [facility] Crisis Line Number: [phone]     Additional resource: Please utilize First call for help.  They can help connect me with many resources to include [employer] Crisis Stabilizaton programs, employment support and much more.    They are also called \"211.\"  That is, some can call \"211\" and reach them.   Their local full phone number is     SKILL: self soothing  Below there are numerous suggestions about how you can engage your five different senses. Go through each of them and think about which ones of them you would find beneficial and enjoyable. The goal of these activities is to reach a state of relaxation, where you would be able to think and behave more effectively. Being calm and relaxed is also important because it affects our physical health in a very positive " "way.  1 Sense of Sight  1. Go to nature (it can be a park nearby) and look at the beautiful way how the judy, the trees, the grass, the benches and the people make a collage of colors and life.  2. Find pictures on the Internet of places that you find soothing to look at. It can be different cities, nature, or artistic images and pictures.  3. Go to a museum or a gallery and enjoy the visual art presented there.  4. Watch a movie that is famous for its beautiful cinematography (like \"2001 Space Odyssey\"). Make sure that its story won't be something that will be difficult for you to watch.  5. Start a collection of pictures that you find pleasurable and soothing to look at, and look at them when needed.  2 Sense of Hearing  1. Talk to a person that you like and whose voice makes you happy or you enjoy hearing.  2. Listen to your favorite music that usually makes you more relaxed.  3. Listen to soothing jazz, or instrumental live radio on [employer]. You might also enjoy classical music, opera or new age music (like the music of [employer]).  4. Go to a park nearby and enjoy the liveliness of sounds around you (birds, wind, people chattering).  5. If you play a musical instrument or sing in your free time, you can try to do that.  6. Listen to an audio book, a podcast or a TV show that you enjoy and see if it makes you feel more relaxed.  3 Sense of Smell  1. Wear a perfume or cologne whose smell you enjoy.  2. Light up a scented candle in your room.  3. Cook a meal that smells delicious to you.  4. Buy some flowers or indoor plants that you would enjoy smelling.  5. Hug a person you love whose smell makes you feel calm.  6. Go someplace where you enjoy the scent (flower shop, perfume shop, restaurant, bakery)  4 Sense of Taste  1. Cook your favorite meal, eat it slowly and savor its taste.  2. Go to your favorite place to eat and buy your favorite meal.  3. Get some snacks or comfort food (chocolate, ice cream, potato chips) " and enjoy it (but don't overdo it).  4. Make yourself a cup of coffee, tea, cocoa or anything else that you enjoy drinking (avoid alcohol).  5. Eat a fresh piece of fruit and enjoy its taste.  6. Chew gum or eat some sweets.  5 Sense of Touch  1. Take your favorite, soft blanket and wrap yourself or simply enjoy how it feels on your skin.  2. Pet your animal and hold it in your lap.  3. Wear comfortable clothes and enjoy how it feels on your skin.  4. Take a shower or a bubble bath and enjoy the warm and soothing water. You can also take a cold shower if you find that more suitable.  5. Get a massage or if you don't have the time, you can massage yourself.  6. Touch something smooth, velvety or fluffy.  From the public domain: Self Soothing : DBT ([email or web address])
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
## 2021-12-13 NOTE — PLAN OF CARE
Discharge instructions reviewed with patient including follow-up care plan. Educated on medication regime and advised not to stop prescribed medication without consulting their physician. Reviewed safety plan and outpatient resources.Denies SI. All belongings which where brought into the hospital have been returned to patient. Escorted off the unit at  1153 accompanied staff. Discharged to  home

## 2021-12-13 NOTE — TREATMENT PLAN
[facility] Treatment Plan    Client's Name: Saritha Ferrera  YOB: 2000      DSM-5 Diagnoses: 300.02 (F41.1) Generalized Anxiety Disorder - by history     296.30 (F33.9) Major Depressive Disorder, Recurrent Episode, Unspecified _ - by history     Substance-Related & Addictive Disorders Alcohol Use Disorder   305.00 (F10.10) Mild no additional specifiers - provisional      Psychosocial / Contextual Factors: Patient presented to the [facility] unit with the following concerns: Saritha Ferrera  is a 21 year old who uses she/her/hers pronouns. Patient presented to the ED alone and was referred to the ED by self. Patient is presenting to the ED for the following concerns: pt states that she has been struggling to take care of herself. She states she had a SI plan to overdose on her medication. She states that she called [facility] for support and was referred to [facility] ED.     Anticipated number of sessions or this episode of care: 1-4    MeasurableTreatment Goal(s) related to diagnosis / functional impairment(s)    Goal 1: Patient will reduce SI intensity and establish sense of hope for self and the future    Objective #A     Patient will make a list of positives: relationships, achievements, support, etc.   Status: New as of December 13, 2021    Intervention(s)  LMHP will assist pt in exploring/identifying positives     Objective #B  Patient will discuss underlying assumptions and self talk that may be contributing to hopelessness  Status: New as of December 13, 2021    Intervention(s)  LMHP will assist client in developing an awareness of and identifying cognitive messages that reinforce hopelessness.       Goal 2: Patient will increase awareness of depression symptoms and their impact on functioning and develop skills to reduce negative impact    Objective #A     Patient will describe thoughts, feelings, and actions associated with depression   Status: New as of December 13, 2021    Intervention(s)  LMHP  will explore and process with pt how depression has impacted them    Objective #B  Patient will increase depression coping skills  Status: New as of December 13, 2021    Intervention(s)  LMHP will teach CBT skills and model their use             Appearance:   Appropriate    Eye Contact:   Good    Psychomotor Behavior: Normal    Attitude:   Cooperative    Orientation:   All   Speech    Rate / Production: Normal     Volume:  Normal    Mood:    Depressed  Normal   Affect:    Appropriate    Thought Content:  Suicidal   Thought Form:  Coherent  Logical    Insight:    Fair           PLAN:   Patient will board in [facility] until patient and treatment deem it appropriate to either discharge or admit to a higher level of care. Details: pt will be on observation status overnight and reassess in the morning on 12/13.       Kate Bartholomew, [facility]                                                           ________
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
## 2021-12-13 NOTE — TELEPHONE ENCOUNTER
12/13/21 Received call from Michelet ([facility] -[phone]) referring Pt for a DA for Adult PHP. Video 12/30/21. CURTIS

## 2021-12-14 ENCOUNTER — TELEPHONE (OUTPATIENT)
Dept: BEHAVIORAL HEALTH | Facility: CLINIC | Age: 21
End: 2021-12-14
Payer: COMMERCIAL

## 2021-12-14 NOTE — TELEPHONE ENCOUNTER
This coordinator spoke with pt and schedule appointment with TC for 12/20/2021 at 2:30pm. Coordinator will manoj referral as complete and will inform referral source of appointment scheduled with TC.       ----- Message from MOLLY Myers sent at 12/13/2021 11:48 AM CST -----  Regarding: empath referral  Transition Clinic Referral     Type of Referral:  bridging therapy in advance of Jan appt    ___x__Therapy    _____Therapy & Medication (Therapy will be scheduled first)  _____Medication Only    Referring Provider Name: MOLLY Haynes    Clinician completing the assessment. Kate Bartholomew [facility]    Referring Provider Contact Phone Number: [phone]    Location/Department/Service Line of Referring Provider. empath    Reason for Transition Clinic Referral: pt lacks support and was in crisis, pt will benefit from therapy contact, crisis stabilization, maintain stability, pt therapy is scheduled Jan 2022    Next Level of Care Patient Will Be Transitioned To:   Scheduled for therapy intake Denia Mcdermott MA   Virtual visit  1/5/2022 2:00pm  [phone]    Psychiatric appt with [facility] virtual care   Virtual visit with Kaci Galvan MA   [phone]    Start Date for Next Level of Care (Required): 1/522 therapy, 12/20/21 med mgmt     Type of Clinical Assessment Completed (Crisis, DA, LANDEN, etc.): crisis    Date Clinical Assessment was Completed: 12/13/2021    Diagnosis:     300.02 (F41.1) Generalized Anxiety Disorder - by history     296.30 (F33.9) Major Depressive Disorder, Recurrent Episode, Unspecified _ - by history     Substance-Related & Addictive Disorders Alcohol Use Disorder   305.00 (F10.10) Mild no additional specifiers - provisional        What Would Be Helpful from the Transition Clinic: supportive counseling, skills building, crisis stabilization     Needs: NO    Does Patient Have Access to Technology: yes    Patient E-mail Address: [email or web address]    Current Patient  Phone Number: [phone]    Clinician Gender Preference (if applicable): YES: female    MOLLY Haynes

## 2021-12-15 ENCOUNTER — PATIENT OUTREACH (OUTPATIENT)
Dept: NURSING | Facility: CLINIC | Age: 21
End: 2021-12-15
Payer: COMMERCIAL

## 2021-12-15 NOTE — PROGRESS NOTES
Clinic Care Coordination Contact    [facility] Follow Up Progress Note: .CC SW called patient to check in since last outreach.     Assessment: Patient was seen at the [facility] on 12/12/2021 for Severe episode of major depression. She presented with depressed mood and suicidal thoughts.   Patient was discharged to home after being assessed and found safe to be at home.   She has been set up with a psychotherapist for med management as well as a therapist.    Patient has started her medication regime:    buPROPion (WELLBUTRIN XL) 150 MG 24 hr tablet  escitalopram (LEXAPRO) 10 MG tablet  propranolol ER (INDERAL LA) 60 MG 24 hr capsule    Patient endorsed that she is feeling better, no SI, she feels she is safe.  Care Gaps:    Health Maintenance Due   Topic Date Due     DEPRESSION ACTION PLAN  Never done     MIGRAINE ACTION PLAN  Never done     COVID-19 Vaccine (1) Never done     PREVENTIVE CARE VISIT  12/13/2020     CHLAMYDIA SCREENING  12/13/2020     PHQ-9  01/08/2021     INFLUENZA VACCINE (1) 09/01/2021       Did not discuss     Goals addressed this encounter:   Goals Addressed                    This Visit's Progress       Patient Stated       1.Mental Health Management (pt-stated)   10%      Goal Statement: I will have mental health supports within the next 3-4 months.   Date Goal set: 12/10/2021  Barriers: Ability to get into therapy appointments   Strengths: Strong advocate for mental health needs  Date to Achieve By: 6/10/2021  Patient expressed understanding of goal: Yes  Action steps to achieve this goal:  1. I will call/ utilize supportive resources provided to me by the CC [facility]:  Anna ANDERSON, [place], [place], and the [facility] at [facility].  2. I will utilize my safety plan as needed for my mental health.  3. I will call the CC SW with questions or concerns.              Intervention/Education provided during outreach: CC SW and patient reviewed her safety again as she is home, last night was the first time she  has slept through the night in weeks. Patient was on the way to her chiropractor appointment for her back pain.     Outreach Frequency: monthly    Plan:   Will outreach to patient in 1 week.   Patient to call therapy office today to check in on her appointment as it was supposed to be today and they didn't call her.       RAMILA Nowak  Clinic Care Coordinator  [facility] and [facility] [place]  [phone]  [email or web address]

## 2021-12-16 ENCOUNTER — VIRTUAL VISIT (OUTPATIENT)
Dept: INTERNAL MEDICINE | Facility: CLINIC | Age: 21
End: 2021-12-16
Payer: COMMERCIAL

## 2021-12-16 DIAGNOSIS — F41.1 GAD (GENERALIZED ANXIETY DISORDER): Primary | ICD-10-CM

## 2021-12-16 DIAGNOSIS — M62.838 MUSCLE SPASM: ICD-10-CM

## 2021-12-16 DIAGNOSIS — F33.1 MAJOR DEPRESSIVE DISORDER, RECURRENT EPISODE, MODERATE (H): ICD-10-CM

## 2021-12-16 PROCEDURE — 99214 OFFICE O/P EST MOD 30 MIN: CPT | Mod: 95 | Performed by: INTERNAL MEDICINE

## 2021-12-16 RX ORDER — CYCLOBENZAPRINE HCL 5 MG
5 TABLET ORAL 3 TIMES DAILY PRN
Qty: 20 TABLET | Refills: 0 | Status: SHIPPED | OUTPATIENT
Start: 2021-12-16 | End: 2023-05-17

## 2021-12-16 NOTE — PROGRESS NOTES
VIDEO VISIT                                                       ASSESSMENT/PLAN                                                      (F41.1) BRUNO (generalized anxiety disorder)  (primary encounter diagnosis)  (F33.1) Major depressive disorder, recurrent episode, moderate (H)  Comment: stable on current regimen; may need to increase doses at a later date, though.   Plan: continue present management; patient to reach out via [employer] in 1-3 weeks re: dosing - refills/plan to follow.    (M62.838) Muscle spasm  Plan: TRIAL of Flexeril as needed.     Total time of video call between patient and provider was 8 minutes (11:00-11:08am). Provider location: office. Patient location: home. Platform: [email or web address].     iVta Ren MD   [facility]  [address] [place] [id number]  T: [phone], F: [phone]    SUBJECTIVE                                                      Saritha Ferrera is a very pleasant 21 year old female who requested a video visit to discuss her recent ER visit:    Patient was made aware that this visit will be billed the same as an in-person visit and has given verbal consent to proceed with this video visit.     Patient presented with depressed mood and suicidal thoughts. Multiple recent psychosocial stressors, most recently a break-up with her boyfriend, contributing to worsening mood.  Previously treated with Lexapro, Wellbutrin XL, propranolol ER, and hydroxyzine for mood but not for some time. Restarted on these medications at lower doses than prior.     Patient is doing okay currently. Tolerating medication(s) well - no adverse side effects. She suspects she may need higher doses than what she was prescribed, but will give these medications time to work. Her main concern is lower back spasms that are making it difficult for her to get adequate rest.    OBJECTIVE                                                       Vitals: No vitals were obtained today due to virtual  visit.    General: appears healthy, alert and no distress  Psychiatric: mentation normal, affect normal/bright, judgement and insight intact, normal speech and appearance well-groomed    ---    (Note was completed, in part, with [employer] voice-recognition software. Documentation reviewed, but some grammatical, spelling, and word errors may remain.)

## 2021-12-20 ENCOUNTER — VIRTUAL VISIT (OUTPATIENT)
Dept: BEHAVIORAL HEALTH | Facility: CLINIC | Age: 21
End: 2021-12-20
Payer: COMMERCIAL

## 2021-12-20 NOTE — PROGRESS NOTES
Progress Note - Transition Clinic    Patient Name: Saritha Ferrera  Date: 2021         Service Type: Individual      Session Start Time: 1435  Session End Time: 1515     Session Length: 40 minutes    Session #: 01    Attendees: Client    Service Modality:  Video Visit:    Presenting Problem: Pt is 21-year-old  female referred to TC from [facility] for assistance in bridging the gap between the ED visit and next level of care scheduled for 2021 (psychiatry), 2021 (adult IOP), and 2021 (individual therapy). Pt was admitted to ED due to increased depression and suicidal ideation.       Provider verified identity through the following two step process.  Patient provided:  Patient  and Patient address    Telemedicine Visit: The patient's condition can be safely assessed and treated via synchronous audio and visual telemedicine encounter.      Reason for Telemedicine Visit: Services only offered telehealth    Originating Site (Patient Location): Patient's home    Distant Site (Provider Location): [facility]    Consent:  The patient/guardian has verbally consented to: the potential risks and benefits of telemedicine (video visit) versus in person care; bill my insurance or make self-payment for services provided; and responsibility for payment of non-covered services.     Patient would like the video invitation sent by:  Send to e-mail at: [email or web address]    Mode of Communication:  Video Conference via [employer]    As the provider I attest to compliance with applicable laws and regulations related to telemedicine.     Treatment Plan Last Reviewed: N/A; initial session    DATA  Interactive Complexity: No  Crisis: No       Progress Since Last Session (Related to Symptoms / Goals / Homework):   Symptoms: N/A; initial session    Homework: Completed in session      Episode of Care Goals:  Achieved / completed to satisfaction - PREPARATION (Decided to change - considering how); Intervened by negotiating a change plan and determining options / strategies for behavior change, identifying triggers, exploring social supports, and working towards setting a date to begin behavior change     Current / Ongoing Stressors and Concerns:   Pt is 21-year-old  female referred to [facility] from [facility] for assistance in bridging the gap between the ED visit and next level of care scheduled for 12.20.2021 (psychiatry), 12.30.2021 (adult IOP), and 01.05.2021 (individual therapy). Pt was admitted to ED due to increased depression and suicidal ideation.    Pt processed about her ongoing grief regarding her brother's death a year ago and her need for continued support due to a lack of supportive environment and increased feelings of depression. Pt reported she is feeling more stable today and assured me of her safety. Pt requested assistance in understanding her appointment schedule and next steps to take. Pt and therapist identified action items for pt to address and pt agreed it would be helpful to meet one more time before 12.30.2021.     Treatment Objective(s) Addressed in This Session:   Identify possible supports and actions to take to stay connected to others     Intervention:   Motivational Interviewing    MI Intervention: Expressed Empathy/Understanding, Supported Autonomy, Collaboration, Evocation, Open-ended questions, Reflections: simple and complex and Reframe     Change Talk Expressed by the Patient: Desire to change Ability to change Reasons to change Need to change Committment to change    Provider Response to Change Talk: E - Evoked more info from patient about behavior change, A - Affirmed patient's thoughts, decisions, or attempts at behavior change, R - Reflected patient's change talk and S - Summarized patient's change talk statements   Solution Focused: Identified problem(s) to address and  assessed/selected actions to take towards resolution   Humanistic: Empathic listening, genuine use of self        ASSESSMENT: Current Emotional / Mental Status (status of significant symptoms):   Risk status (Self / Other harm or suicidal ideation)   Patient denies current fears or concerns for personal safety.   Patient denies current or recent suicidal ideation or behaviors.   Patient denies current or recent homicidal ideation or behaviors.   Patient denies current or recent self injurious behavior or ideation.   Patient denies other safety concerns.   Patient reports there has been no change in risk factors since their last session.     Patient reports there has been no change in protective factors since their last session.     Recommended that patient call 911 or go to the local ED should there be a change in any of these risk factors.     Appearance:   Appropriate    Eye Contact:   Good    Psychomotor Behavior: Normal    Attitude:   Cooperative  Interested Friendly   Orientation:   All   Speech    Rate / Production: Normal/ Responsive    Volume:  Normal    Mood:    Normal Sad    Affect:    Appropriate  Tearful   Thought Content:  Clear    Thought Form:  Coherent  Logical    Insight:    Good  and Intellectual Insight     Medication Review:   No changes to current psychiatric medication(s)     Medication Compliance:   Yes     Changes in Health Issues:   None reported     Chemical Use Review:   Substance Use: Chemical use reviewed, no active concerns identified      Tobacco Use: No current tobacco use.         Diagnosis:  1. BRUNO (generalized anxiety disorder) F41.1   2. Major depressive disorder, recurrent episode, moderate (H) F33.9       Collateral Reports Completed:   Not Applicable    PLAN: (Patient Tasks / Therapist Tasks / Other)  -Pt to call psychiatry back and confirm new appointment  -Pt to follow-up with next level of care on 12.30.2021  -2nd and final session scheduled for 12.29.2021 @1pm        [place]  Aaron, [facility], [facility]   December 20, 2021

## 2021-12-28 ENCOUNTER — TELEPHONE (OUTPATIENT)
Dept: BEHAVIORAL HEALTH | Facility: CLINIC | Age: 21
End: 2021-12-28
Payer: COMMERCIAL

## 2021-12-29 ASSESSMENT — ANXIETY QUESTIONNAIRES
GAD7 TOTAL SCORE: 8
GAD7 TOTAL SCORE: 8
2. NOT BEING ABLE TO STOP OR CONTROL WORRYING: SEVERAL DAYS
7. FEELING AFRAID AS IF SOMETHING AWFUL MIGHT HAPPEN: NOT AT ALL
4. TROUBLE RELAXING: MORE THAN HALF THE DAYS
7. FEELING AFRAID AS IF SOMETHING AWFUL MIGHT HAPPEN: NOT AT ALL
3. WORRYING TOO MUCH ABOUT DIFFERENT THINGS: MORE THAN HALF THE DAYS
1. FEELING NERVOUS, ANXIOUS, OR ON EDGE: MORE THAN HALF THE DAYS
6. BECOMING EASILY ANNOYED OR IRRITABLE: SEVERAL DAYS
5. BEING SO RESTLESS THAT IT IS HARD TO SIT STILL: NOT AT ALL
GAD7 TOTAL SCORE: 8

## 2021-12-29 ASSESSMENT — PATIENT HEALTH QUESTIONNAIRE - PHQ9
SUM OF ALL RESPONSES TO PHQ QUESTIONS 1-9: 7
10. IF YOU CHECKED OFF ANY PROBLEMS, HOW DIFFICULT HAVE THESE PROBLEMS MADE IT FOR YOU TO DO YOUR WORK, TAKE CARE OF THINGS AT HOME, OR GET ALONG WITH OTHER PEOPLE: SOMEWHAT DIFFICULT
SUM OF ALL RESPONSES TO PHQ QUESTIONS 1-9: 7

## 2021-12-30 ENCOUNTER — HOSPITAL ENCOUNTER (OUTPATIENT)
Dept: BEHAVIORAL HEALTH | Facility: CLINIC | Age: 21
Discharge: HOME | End: 2021-12-30
Attending: FAMILY MEDICINE | Admitting: FAMILY MEDICINE
Payer: COMMERCIAL

## 2021-12-30 PROCEDURE — 90791 PSYCH DIAGNOSTIC EVALUATION: CPT | Mod: GT,95 | Performed by: COUNSELOR

## 2021-12-30 RX ORDER — SPIRONOLACTONE 100 MG/1
100 TABLET, FILM COATED ORAL DAILY
COMMUNITY

## 2021-12-30 ASSESSMENT — ANXIETY QUESTIONNAIRES: GAD7 TOTAL SCORE: 8

## 2021-12-30 ASSESSMENT — PATIENT HEALTH QUESTIONNAIRE - PHQ9: SUM OF ALL RESPONSES TO PHQ QUESTIONS 1-9: 7
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
## 2022-01-07 LAB — PHQ9 SCORE: 15

## 2022-01-17 ENCOUNTER — PATIENT OUTREACH (OUTPATIENT)
Dept: NURSING | Facility: CLINIC | Age: 22
End: 2022-01-17

## 2022-01-17 NOTE — PROGRESS NOTES
Clinic Care Coordination Contact    ARTHUR VAZQUEZ Follow Up Progress Note:  ARTHUR VAZQUEZ called patient to check in since last outreach.        Assessment: Patient is doing better than she was in December. She has made appointments for therapy, which she has been utilizing. She has also made an appointment with a provider for medication management. Patient starts a new job tomorrow and feels things are going well.   Patient is worried her meds are going to run out, ARTHUR VAZQUEZ encouraged her to call [facility] where her medication was previously managed to see if there is anything they can do. Patient will be seeing the same provider. She will call to see if there is anything they can do.     Patient has no suicidal ideation and she feels her depression is better controlled.     Care Gaps:    Health Maintenance Due   Topic Date Due     DEPRESSION ACTION PLAN  Never done     MIGRAINE ACTION PLAN  Never done     COVID-19 Vaccine (1) Never done     PREVENTIVE CARE VISIT  12/13/2020     CHLAMYDIA SCREENING  12/13/2020     INFLUENZA VACCINE (1) 09/01/2021       Did not discuss    Goals addressed this encounter:   Goals Addressed                    This Visit's Progress       Patient Stated       1.Mental Health Management (pt-stated)   20%      Goal Statement: I will have mental health supports within the next 3-4 months.   Date Goal set: 12/10/2021  Barriers: Ability to get into therapy appointments   Strengths: Strong advocate for mental health needs  Date to Achieve By: 6/10/2021  Patient expressed understanding of goal: Yes  Action steps to achieve this goal:  1. I will call/ utilize supportive resources provided to me by the ARTHUR VAZQUEZ:  Anna ANDERSON, [place], [place], and the Empath Unit at [facility].  2. I will utilize my safety plan as needed for my mental health.  3. I will call the CC TAYLOR with questions or concerns.              Intervention/Education provided during outreach: ARTHUR VAZQUEZ encouraged her to call [facility] where her medication was  previously managed to see if there is anything they can do. Patient will be seeing the same provider. She will call to see if there is anything they can do.    Outreach Frequency: monthly    Plan:   Patient feels she has enough support at this time, she does not feel she needs CC SW.   No further outreaches will be made at this time unless a new referral is made or a change in the pt's status occurs. Patient was provided with CC SW contact information and encouraged to call with any questions or concerns.    RAMILA Nowak  Clinic Care Coordinator  [facility] and [facility]  [phone]  [email or web address]

## 2022-01-26 ENCOUNTER — TRANSFERRED RECORDS (OUTPATIENT)
Dept: HEALTH INFORMATION MANAGEMENT | Facility: CLINIC | Age: 22
End: 2022-01-26

## 2022-01-26 LAB — PHQ9 SCORE: 15

## 2022-02-19 ENCOUNTER — HEALTH MAINTENANCE LETTER (OUTPATIENT)
Age: 22
End: 2022-02-19

## 2022-02-23 ENCOUNTER — TRANSFERRED RECORDS (OUTPATIENT)
Dept: HEALTH INFORMATION MANAGEMENT | Facility: CLINIC | Age: 22
End: 2022-02-23

## 2022-04-27 ENCOUNTER — TRANSFERRED RECORDS (OUTPATIENT)
Dept: HEALTH INFORMATION MANAGEMENT | Facility: CLINIC | Age: 22
End: 2022-04-27

## 2022-07-08 NOTE — PROGRESS NOTES
Addended by: BRITANY DAWN on: 7/8/2022 02:12 PM     Modules accepted: Orders     BP: Data Unavailable    LAST PAP/EXAM: No results found for: PAP  URINE HCG:negative    The following medication was given:     MEDICATION: Depo Provera 150mg  ROUTE: IM  SITE: LUQ - Gluteus  : Mylan  LOT #: [id number]  EXP:06/2020  NEXT INJECTION DUE: 11/26/19 - 12/10/19   Provider: Dr. Mikal Hsieh, [facility]

## 2022-10-22 ENCOUNTER — HEALTH MAINTENANCE LETTER (OUTPATIENT)
Age: 22
End: 2022-10-22

## 2022-11-03 ENCOUNTER — TRANSFERRED RECORDS (OUTPATIENT)
Dept: HEALTH INFORMATION MANAGEMENT | Facility: CLINIC | Age: 22
End: 2022-11-03

## 2023-03-09 ENCOUNTER — TRANSFERRED RECORDS (OUTPATIENT)
Dept: INTERNAL MEDICINE | Facility: CLINIC | Age: 23
End: 2023-03-09

## 2023-03-09 LAB
PHQ9 SCORE: 12
PHQ9 SCORE: 12

## 2023-04-01 ENCOUNTER — HEALTH MAINTENANCE LETTER (OUTPATIENT)
Age: 23
End: 2023-04-01

## 2023-05-03 ENCOUNTER — TRANSFERRED RECORDS (OUTPATIENT)
Dept: HEALTH INFORMATION MANAGEMENT | Facility: CLINIC | Age: 23
End: 2023-05-03
Payer: COMMERCIAL

## 2023-05-15 ENCOUNTER — OFFICE VISIT (OUTPATIENT)
Dept: URGENT CARE | Facility: URGENT CARE | Age: 23
End: 2023-05-15
Payer: COMMERCIAL

## 2023-05-15 ENCOUNTER — HOSPITAL ENCOUNTER (EMERGENCY)
Facility: CLINIC | Age: 23
Discharge: LEFT BEFORE DISCHARGE | End: 2023-05-15
Payer: COMMERCIAL

## 2023-05-15 VITALS
OXYGEN SATURATION: 99 % | TEMPERATURE: 98.3 F | HEART RATE: 71 BPM | DIASTOLIC BLOOD PRESSURE: 80 MMHG | SYSTOLIC BLOOD PRESSURE: 122 MMHG | RESPIRATION RATE: 18 BRPM

## 2023-05-15 DIAGNOSIS — R10.13 ABDOMINAL PAIN, EPIGASTRIC: Primary | ICD-10-CM

## 2023-05-15 DIAGNOSIS — R10.9 CONTINUOUS SEVERE ABDOMINAL PAIN: ICD-10-CM

## 2023-05-15 PROCEDURE — 99214 OFFICE O/P EST MOD 30 MIN: CPT | Performed by: PHYSICIAN ASSISTANT

## 2023-05-15 RX ORDER — BUPROPION HYDROCHLORIDE 300 MG/1
1 TABLET ORAL EVERY MORNING
COMMUNITY
Start: 2023-03-09

## 2023-05-15 NOTE — PROGRESS NOTES
Assessment & Plan     Abdominal pain, epigastric    Patient is having severe abdominal pain  She was given a GI cocktail but this did not help at all with pain  Due to severity of pain patient is being sent to the ED  - lidocaine (viscous) (XYLOCAINE) 2 % 15 mL, alum & mag hydroxide-simethicone (MAALOX) 15 mL GI Cocktail    Continuous severe abdominal pain    Patient to the ED now for imaging, blood work and pain control      Review of external notes as documented elsewhere in note      Nicotine/Tobacco Cessation:  She reports that she has been smoking cigarettes. She has never used smokeless tobacco.  Nicotine/Tobacco Cessation Plan:     CONSULTATION/REFERRAL to ED    No follow-ups on file.    Alvaro Casillas, [facility], PA-C  [facility] TETE Melara is a 23 year old, presenting for the following health issues:  Abdominal Pain (Upper abdominal pain that started this morning and is getting a lot worse )         View : No data to display.              HPI   Review of Systems   Constitutional, HEENT, cardiovascular, pulmonary, gi and gu systems are negative, except as otherwise noted.      Objective    /80   Pulse 71   Temp 98.3  F (36.8  C) (Tympanic)   Resp 18   SpO2 99%   There is no height or weight on file to calculate BMI.  Physical Exam   GENERAL: healthy, alert and no distress  RESP: lungs clear to auscultation - no rales, rhonchi or wheezes  CV: regular rate and rhythm, normal S1 S2, no S3 or S4, no murmur, click or rub, no peripheral edema and peripheral pulses strong  ABDOMEN: tenderness epigastric and RUQ  MS: no gross musculoskeletal defects noted, no edema  SKIN: no suspicious lesions or rashes  NEURO: Normal strength and tone, mentation intact and speech normal  PSYCH: mentation appears normal, affect normal/bright

## 2023-05-16 ENCOUNTER — HOSPITAL ENCOUNTER (EMERGENCY)
Facility: CLINIC | Age: 23
Discharge: HOME | End: 2023-05-16
Attending: EMERGENCY MEDICINE | Admitting: EMERGENCY MEDICINE
Payer: COMMERCIAL

## 2023-05-16 ENCOUNTER — APPOINTMENT (OUTPATIENT)
Dept: ULTRASOUND IMAGING | Facility: CLINIC | Age: 23
End: 2023-05-16
Attending: EMERGENCY MEDICINE
Payer: COMMERCIAL

## 2023-05-16 ENCOUNTER — APPOINTMENT (OUTPATIENT)
Dept: CT IMAGING | Facility: CLINIC | Age: 23
End: 2023-05-16
Attending: EMERGENCY MEDICINE
Payer: COMMERCIAL

## 2023-05-16 VITALS
OXYGEN SATURATION: 97 % | HEART RATE: 61 BPM | RESPIRATION RATE: 16 BRPM | WEIGHT: 200 LBS | TEMPERATURE: 97.6 F | BODY MASS INDEX: 29.53 KG/M2 | DIASTOLIC BLOOD PRESSURE: 71 MMHG | SYSTOLIC BLOOD PRESSURE: 105 MMHG

## 2023-05-16 DIAGNOSIS — R10.13 ABDOMINAL PAIN, EPIGASTRIC: ICD-10-CM

## 2023-05-16 LAB
ALBUMIN SERPL BCG-MCNC: 4.3 G/DL (ref 3.5–5.2)
ALBUMIN UR-MCNC: NEGATIVE MG/DL
ALP SERPL-CCNC: 57 U/L (ref 35–104)
ALT SERPL W P-5'-P-CCNC: 15 U/L (ref 10–35)
ANION GAP SERPL CALCULATED.3IONS-SCNC: 11 MMOL/L (ref 7–15)
APPEARANCE UR: ABNORMAL
AST SERPL W P-5'-P-CCNC: 19 U/L (ref 10–35)
BACTERIA #/AREA URNS HPF: ABNORMAL /HPF
BASOPHILS # BLD AUTO: 0 10E3/UL (ref 0–0.2)
BASOPHILS NFR BLD AUTO: 1 %
BILIRUB SERPL-MCNC: 0.5 MG/DL
BILIRUB UR QL STRIP: NEGATIVE
BUN SERPL-MCNC: 8.9 MG/DL (ref 6–20)
CALCIUM SERPL-MCNC: 8.9 MG/DL (ref 8.6–10)
CHLORIDE SERPL-SCNC: 104 MMOL/L (ref 98–107)
COLOR UR AUTO: YELLOW
CREAT SERPL-MCNC: 0.79 MG/DL (ref 0.51–0.95)
DEPRECATED HCO3 PLAS-SCNC: 23 MMOL/L (ref 22–29)
EOSINOPHIL # BLD AUTO: 0.3 10E3/UL (ref 0–0.7)
EOSINOPHIL NFR BLD AUTO: 4 %
ERYTHROCYTE [DISTWIDTH] IN BLOOD BY AUTOMATED COUNT: 11.9 % (ref 10–15)
GFR SERPL CREATININE-BSD FRML MDRD: >90 ML/MIN/1.73M2
GLUCOSE SERPL-MCNC: 103 MG/DL (ref 70–99)
GLUCOSE UR STRIP-MCNC: NEGATIVE MG/DL
HCG SERPL QL: NEGATIVE
HCT VFR BLD AUTO: 43.6 % (ref 35–47)
HGB BLD-MCNC: 15.2 G/DL (ref 11.7–15.7)
HGB UR QL STRIP: ABNORMAL
HOLD SPECIMEN: NORMAL
IMM GRANULOCYTES # BLD: 0 10E3/UL
IMM GRANULOCYTES NFR BLD: 0 %
KETONES UR STRIP-MCNC: 20 MG/DL
LEUKOCYTE ESTERASE UR QL STRIP: NEGATIVE
LIPASE SERPL-CCNC: 11 U/L (ref 13–60)
LYMPHOCYTES # BLD AUTO: 1.9 10E3/UL (ref 0.8–5.3)
LYMPHOCYTES NFR BLD AUTO: 32 %
MCH RBC QN AUTO: 31 PG (ref 26.5–33)
MCHC RBC AUTO-ENTMCNC: 34.9 G/DL (ref 31.5–36.5)
MCV RBC AUTO: 89 FL (ref 78–100)
MONOCYTES # BLD AUTO: 0.7 10E3/UL (ref 0–1.3)
MONOCYTES NFR BLD AUTO: 11 %
MUCOUS THREADS #/AREA URNS LPF: PRESENT /LPF
NEUTROPHILS # BLD AUTO: 3.1 10E3/UL (ref 1.6–8.3)
NEUTROPHILS NFR BLD AUTO: 52 %
NITRATE UR QL: NEGATIVE
NRBC # BLD AUTO: 0 10E3/UL
NRBC BLD AUTO-RTO: 0 /100
PH UR STRIP: 6 [PH] (ref 5–7)
PLATELET # BLD AUTO: 218 10E3/UL (ref 150–450)
POTASSIUM SERPL-SCNC: 3.8 MMOL/L (ref 3.4–5.3)
PROT SERPL-MCNC: 7 G/DL (ref 6.4–8.3)
RBC # BLD AUTO: 4.91 10E6/UL (ref 3.8–5.2)
RBC URINE: 3 /HPF
SODIUM SERPL-SCNC: 138 MMOL/L (ref 136–145)
SP GR UR STRIP: 1.02 (ref 1–1.03)
SQUAMOUS EPITHELIAL: 15 /HPF
UROBILINOGEN UR STRIP-MCNC: NORMAL MG/DL
WBC # BLD AUTO: 5.9 10E3/UL (ref 4–11)
WBC URINE: 2 /HPF

## 2023-05-16 PROCEDURE — 96361 HYDRATE IV INFUSION ADD-ON: CPT

## 2023-05-16 PROCEDURE — 250N000011 HC RX IP 250 OP 636: Performed by: EMERGENCY MEDICINE

## 2023-05-16 PROCEDURE — 74177 CT ABD & PELVIS W/CONTRAST: CPT

## 2023-05-16 PROCEDURE — 99285 EMERGENCY DEPT VISIT HI MDM: CPT | Mod: 25

## 2023-05-16 PROCEDURE — 96374 THER/PROPH/DIAG INJ IV PUSH: CPT | Mod: 59

## 2023-05-16 PROCEDURE — 80053 COMPREHEN METABOLIC PANEL: CPT | Performed by: EMERGENCY MEDICINE

## 2023-05-16 PROCEDURE — 258N000003 HC RX IP 258 OP 636: Performed by: EMERGENCY MEDICINE

## 2023-05-16 PROCEDURE — 96375 TX/PRO/DX INJ NEW DRUG ADDON: CPT

## 2023-05-16 PROCEDURE — 76705 ECHO EXAM OF ABDOMEN: CPT

## 2023-05-16 PROCEDURE — 96376 TX/PRO/DX INJ SAME DRUG ADON: CPT

## 2023-05-16 PROCEDURE — 81001 URINALYSIS AUTO W/SCOPE: CPT | Performed by: EMERGENCY MEDICINE

## 2023-05-16 PROCEDURE — 85025 COMPLETE CBC W/AUTO DIFF WBC: CPT | Performed by: EMERGENCY MEDICINE

## 2023-05-16 PROCEDURE — 83690 ASSAY OF LIPASE: CPT | Performed by: EMERGENCY MEDICINE

## 2023-05-16 PROCEDURE — 36415 COLL VENOUS BLD VENIPUNCTURE: CPT | Performed by: EMERGENCY MEDICINE

## 2023-05-16 PROCEDURE — 84703 CHORIONIC GONADOTROPIN ASSAY: CPT | Performed by: EMERGENCY MEDICINE

## 2023-05-16 RX ORDER — HYDROMORPHONE HYDROCHLORIDE 1 MG/ML
0.5 INJECTION, SOLUTION INTRAMUSCULAR; INTRAVENOUS; SUBCUTANEOUS ONCE
Status: COMPLETED | OUTPATIENT
Start: 2023-05-16 | End: 2023-05-16

## 2023-05-16 RX ORDER — SUCRALFATE ORAL 1 G/10ML
1 SUSPENSION ORAL 4 TIMES DAILY
Qty: 414 ML | Refills: 0 | Status: SHIPPED | OUTPATIENT
Start: 2023-05-16 | End: 2023-09-25

## 2023-05-16 RX ORDER — HYDROCODONE BITARTRATE AND ACETAMINOPHEN 5; 325 MG/1; MG/1
1 TABLET ORAL EVERY 6 HOURS PRN
Qty: 10 TABLET | Refills: 0 | Status: SHIPPED | OUTPATIENT
Start: 2023-05-16 | End: 2024-06-05

## 2023-05-16 RX ORDER — IOPAMIDOL 755 MG/ML
500 INJECTION, SOLUTION INTRAVASCULAR ONCE
Status: COMPLETED | OUTPATIENT
Start: 2023-05-16 | End: 2023-05-16

## 2023-05-16 RX ORDER — KETOROLAC TROMETHAMINE 15 MG/ML
15 INJECTION, SOLUTION INTRAMUSCULAR; INTRAVENOUS ONCE
Status: COMPLETED | OUTPATIENT
Start: 2023-05-16 | End: 2023-05-16

## 2023-05-16 RX ORDER — DICYCLOMINE HCL 20 MG
20 TABLET ORAL 2 TIMES DAILY
Qty: 20 TABLET | Refills: 0 | Status: SHIPPED | OUTPATIENT
Start: 2023-05-16 | End: 2023-05-26

## 2023-05-16 RX ORDER — ONDANSETRON 2 MG/ML
4 INJECTION INTRAMUSCULAR; INTRAVENOUS ONCE
Status: COMPLETED | OUTPATIENT
Start: 2023-05-16 | End: 2023-05-16

## 2023-05-16 RX ORDER — SODIUM CHLORIDE 9 MG/ML
INJECTION, SOLUTION INTRAVENOUS CONTINUOUS
Status: DISCONTINUED | OUTPATIENT
Start: 2023-05-16 | End: 2023-05-16 | Stop reason: HOSPADM

## 2023-05-16 RX ORDER — ONDANSETRON 4 MG/1
4 TABLET, ORALLY DISINTEGRATING ORAL EVERY 6 HOURS PRN
Qty: 10 TABLET | Refills: 0 | Status: SHIPPED | OUTPATIENT
Start: 2023-05-16 | End: 2023-05-19

## 2023-05-16 RX ADMIN — SODIUM CHLORIDE 1000 ML: 9 INJECTION, SOLUTION INTRAVENOUS at 09:17

## 2023-05-16 RX ADMIN — HYDROMORPHONE HYDROCHLORIDE 0.5 MG: 1 INJECTION, SOLUTION INTRAMUSCULAR; INTRAVENOUS; SUBCUTANEOUS at 09:18

## 2023-05-16 RX ADMIN — HYDROMORPHONE HYDROCHLORIDE 0.5 MG: 1 INJECTION, SOLUTION INTRAMUSCULAR; INTRAVENOUS; SUBCUTANEOUS at 11:33

## 2023-05-16 RX ADMIN — KETOROLAC TROMETHAMINE 15 MG: 15 INJECTION, SOLUTION INTRAMUSCULAR; INTRAVENOUS at 10:43

## 2023-05-16 RX ADMIN — ONDANSETRON 4 MG: 2 INJECTION INTRAMUSCULAR; INTRAVENOUS at 09:17

## 2023-05-16 RX ADMIN — SODIUM CHLORIDE 65 ML: 9 INJECTION, SOLUTION INTRAVENOUS at 10:05

## 2023-05-16 RX ADMIN — IOPAMIDOL 100 ML: 755 INJECTION, SOLUTION INTRAVENOUS at 10:05

## 2023-05-16 ASSESSMENT — ACTIVITIES OF DAILY LIVING (ADL)
ADLS_ACUITY_SCORE: 35
ADLS_ACUITY_SCORE: 35

## 2023-05-16 NOTE — ED PROVIDER NOTES
History     Chief Complaint:  Abdominal Pain       HPI   Saritha Ferrera is a 23 year old female  who presents with worsening abdominal pain.    Patient is a 23-year-old female otherwise healthy.  Patient states that since yesterday she had pretty severe abdominal pain.  Patient was seen in urgent care referred to the emergency room yesterday patient went to [facility] waited a number of hours could not even be triage as they were so busy.  Decided to go home.  Pain has been progressively worse.  As she feels it is cramp-like and at times it worse at times is better.  No urinary symptoms no gross hematuria no constipation or diarrhea..      Independent Historian:   None - Patient Only    Review of External Notes:    ROS:  Review of Systems    Allergies:  No Known Drug Allergy     Medications:    buPROPion (WELLBUTRIN XL) 150 MG 24 hr tablet  buPROPion (WELLBUTRIN XL) 300 MG 24 hr tablet  cyclobenzaprine (FLEXERIL) 5 MG tablet  escitalopram (LEXAPRO) 10 MG tablet  hydrOXYzine (VISTARIL) 25 MG capsule  propranolol ER (INDERAL LA) 60 MG 24 hr capsule  spironolactone (ALDACTONE) 100 MG tablet        Past Medical History:    Past Medical History:   Diagnosis Date     Adjustment disorder with depressed mood 11/4/2013     Depressive disorder      Hx of migraines      Migraine with aura 4/10/2015       Past Surgical History:    Past Surgical History:   Procedure Laterality Date     HC TOOTH EXTRACTION W/FORCEP       NO HISTORY OF SURGERY          Family History:    family history includes Alcohol/Drug in her sister; Anxiety Disorder in her father, sister, and sister; Cancer in her mother and paternal aunt; Depression in her brother and father; Mental Illness in her father and mother; Other - See Comments in her mother and sister; Substance Abuse in her father, mother, sister, and sister.    Social History:   reports that she has been smoking cigarettes. She has never used smokeless tobacco. She reports current  alcohol use. She reports current drug use. Frequency: 1.00 time per week. Drug: Marijuana.  PCP: Vita Ren     Physical Exam     Patient Vitals for the past 24 hrs:   BP Temp Temp src Pulse Resp SpO2 Weight   05/16/23 0845 124/83 -- -- -- -- -- --   05/16/23 0828 (!) 126/94 97.6  F (36.4  C) Temporal 69 18 99 % 90.7 kg (200 lb)        Physical Exam  Vitals and nursing note reviewed.   Eyes:      General: No scleral icterus.     Extraocular Movements: Extraocular movements intact.   Cardiovascular:      Rate and Rhythm: Normal rate and regular rhythm.   Pulmonary:      Effort: Pulmonary effort is normal.      Breath sounds: Normal breath sounds.   Abdominal:      General: Bowel sounds are normal.      Palpations: Abdomen is soft.      Tenderness: There is abdominal tenderness in the epigastric area.      Hernia: No hernia is present.   Skin:     General: Skin is warm.      Capillary Refill: Capillary refill takes less than 2 seconds.   Neurological:      General: No focal deficit present.      Mental Status: She is alert.   Psychiatric:         Mood and Affect: Mood is anxious.           Emergency Department Course   [unfilled]    Imaging:  Abdomen US, limited (RUQ only)   Final Result   IMPRESSION:  Normal limited abdominal ultrasound.      JAYMIE ENG MD            SYSTEM ID:  [id number]      CT Abdomen Pelvis w Contrast   Final Result   IMPRESSION:    1.  No specific acute abnormality identified.   2.  Two separate distal endometrial cavities raising the possibility   of a mullerian developmental variant, for example bicornuate uterus or   otherwise.      JAMYIE ENG MD            SYSTEM ID:  [id number]         Report per radiology    Laboratory:  Labs Ordered and Resulted from Time of ED Arrival to Time of ED Departure   ROUTINE UA WITH MICROSCOPIC REFLEX TO CULTURE - Abnormal       Result Value    Color Urine Yellow      Appearance Urine Slightly Cloudy (*)     Glucose Urine Negative      Bilirubin Urine  Negative      Ketones Urine 20 (*)     Specific Gravity Urine 1.022      Blood Urine Trace (*)     pH Urine 6.0      Protein Albumin Urine Negative      Urobilinogen Urine Normal      Nitrite Urine Negative      Leukocyte Esterase Urine Negative      Bacteria Urine Few (*)     Mucus Urine Present (*)     RBC Urine 3 (*)     WBC Urine 2      Squamous Epithelials Urine 15 (*)    COMPREHENSIVE METABOLIC PANEL - Abnormal    Sodium 138      Potassium 3.8      Chloride 104      Carbon Dioxide (CO2) 23      Anion Gap 11      Urea Nitrogen 8.9      Creatinine 0.79      Calcium 8.9      Glucose 103 (*)     Alkaline Phosphatase 57      AST 19      ALT 15      Protein Total 7.0      Albumin 4.3      Bilirubin Total 0.5      GFR Estimate >90     LIPASE - Abnormal    Lipase 11 (*)    HCG QUALITATIVE PREGNANCY - Normal    hCG Serum Qualitative Negative     CBC WITH PLATELETS AND DIFFERENTIAL    WBC Count 5.9      RBC Count 4.91      Hemoglobin 15.2      Hematocrit 43.6      MCV 89      MCH 31.0      MCHC 34.9      RDW 11.9      Platelet Count 218      % Neutrophils 52      % Lymphocytes 32      % Monocytes 11      % Eosinophils 4      % Basophils 1      % Immature Granulocytes 0      NRBCs per 100 WBC 0      Absolute Neutrophils 3.1      Absolute Lymphocytes 1.9      Absolute Monocytes 0.7      Absolute Eosinophils 0.3      Absolute Basophils 0.0      Absolute Immature Granulocytes 0.0      Absolute NRBCs 0.0          Emergency Department Course & Assessments:    PSS-3    Date and Time Over the past 2 weeks have you felt down, depressed, or hopeless? Over the past 2 weeks have you had thoughts of killing yourself? Have you ever attempted to kill yourself? When did this last happen? User   05/16/23 0829 -- -- yes more than 6 months ago RG   05/16/23 0827 no no no -- RG                      Interventions:  Medications   0.9% sodium chloride BOLUS (has no administration in time range)     Followed by   sodium chloride 0.9% infusion  (has no administration in time range)   ketorolac (TORADOL) injection 15 mg (has no administration in time range)   ondansetron (ZOFRAN) injection 4 mg (has no administration in time range)   HYDROmorphone (PF) (DILAUDID) injection 0.5 mg (has no administration in time range)        Assessments:       Independent Interpretation (X-rays, CTs, rhythm strip):  None    Consultations/Discussion of Management or Tests:  None        Social Determinants of Health affecting care:   None    Disposition:  The patient was discharged to home.     Impression & Plan        Medical Decision Making:  Patient presents with severe upper abdominal pain.  Initially considered gallbladder but due to tenderness in the lower abdomen CT was recommended for complete assessment and negative patient continued to complain severe pain and therefore ultrasound of the gallbladder also performed to rule out gallstones and also negative.  Cause of her symptoms are unclear.  Patient offered dyspeptic medications medications for pain and nausea and follow-up with primary care was discharged home in stable condition.  Concerns for diverticulitis or appendicitis by CT scan no signs of cholecystitis pancreatitis or common bile duct obstruction by ultrasound or by blood work.    Critical Care time:  Was 0minutes for this patient excluding procedures.    Diagnosis:    ICD-10-CM    1. Abdominal pain, epigastric  R10.13            Discharge Medications:  Discharge Medication List as of 5/16/2023 12:11 PM      START taking these medications    Details   dicyclomine (BENTYL) 20 MG tablet Take 1 tablet (20 mg) by mouth 2 times daily for 10 days, Disp-20 tablet, R-0, Local Print      HYDROcodone-acetaminophen (NORCO) 5-325 MG tablet Take 1 tablet by mouth every 6 hours as needed for pain, Disp-10 tablet, R-0, Local Print      ondansetron (ZOFRAN ODT) 4 MG ODT tab Take 1 tablet (4 mg) by mouth every 6 hours as needed for nausea or vomiting, Disp-10 tablet, R-0,  Local Print      sucralfate (CARAFATE) 1 GM/10ML suspension Take 10 mLs (1 g) by mouth 4 times daily, Disp-414 mL, R-0, Local Print                Cash Booker MD  5/16/2023   Cash Booker MD Goodman, Brian Samuel, MD  05/18/23 5113

## 2023-05-16 NOTE — ED TRIAGE NOTES
Pt reports that she has been having upper abdominal pain for the last couple of days went to UC yesterday and was given GI cocktail that didn't help and was told to come to ED. PT reports pain is still persisting. no surgical hx and no changes in bowel or bladder. VSS and ABC's intact

## 2023-05-16 NOTE — DISCHARGE INSTRUCTIONS
We have gone through an exhaustive evaluation of abdominal pain this is included ultrasound CT scan and lab work.  As we have discussed headset test answers for abdominal pain only occur about 20% of the time in the emergency room.  If you develop a fever greater than 100.4 if you develop black tarry stool or red blood in the vomit if you develop yellow color to the eyes or [employer] colored urine return to the emergency room for reassessment.  Due to her lack of findings please follow-up with your regular doctor to discuss further work-up including HIDA scan or referral to GI for further work-up.  We are offering medications to use when needed for pain as a bridge to follow-up with your regular doctor.

## 2023-05-17 ENCOUNTER — PATIENT OUTREACH (OUTPATIENT)
Dept: CARE COORDINATION | Facility: CLINIC | Age: 23
End: 2023-05-17

## 2023-05-17 ENCOUNTER — OFFICE VISIT (OUTPATIENT)
Dept: INTERNAL MEDICINE | Facility: CLINIC | Age: 23
End: 2023-05-17
Payer: COMMERCIAL

## 2023-05-17 VITALS
RESPIRATION RATE: 19 BRPM | BODY MASS INDEX: 29.59 KG/M2 | DIASTOLIC BLOOD PRESSURE: 66 MMHG | SYSTOLIC BLOOD PRESSURE: 108 MMHG | WEIGHT: 199.8 LBS | HEIGHT: 69 IN | TEMPERATURE: 96.8 F | HEART RATE: 74 BPM | OXYGEN SATURATION: 95 %

## 2023-05-17 DIAGNOSIS — R10.13 ABDOMINAL PAIN, EPIGASTRIC: Primary | ICD-10-CM

## 2023-05-17 PROCEDURE — 87338 HPYLORI STOOL AG IA: CPT | Performed by: INTERNAL MEDICINE

## 2023-05-17 PROCEDURE — 99213 OFFICE O/P EST LOW 20 MIN: CPT | Performed by: INTERNAL MEDICINE

## 2023-05-17 ASSESSMENT — PATIENT HEALTH QUESTIONNAIRE - PHQ9
SUM OF ALL RESPONSES TO PHQ QUESTIONS 1-9: 8
10. IF YOU CHECKED OFF ANY PROBLEMS, HOW DIFFICULT HAVE THESE PROBLEMS MADE IT FOR YOU TO DO YOUR WORK, TAKE CARE OF THINGS AT HOME, OR GET ALONG WITH OTHER PEOPLE: EXTREMELY DIFFICULT
SUM OF ALL RESPONSES TO PHQ QUESTIONS 1-9: 8

## 2023-05-17 NOTE — PROGRESS NOTES
"  ASSESSMENT/PLAN                                                      (R10.13) Abdominal pain, epigastric  (primary encounter diagnosis)  Comment: benign exam; benign work-up to date.  Plan: H. pylori stool study ordered - patient to , complete, and return when able; bowel cleanse recommended for moderate stool burden; if symptoms persist after bowel cleanse and H. pylori stool study is negative, will start high-dose PPI (x 3 months) and an H2B as needed.    Vita Ren MD   [address] [address]  T: [phone], F: [phone]    SUBJECTIVE                                                      Saritha Ferrera is a very pleasant 23 year old female who presents for ER follow-up:    Patient was seen in the ER yesterday for upper abdominal pain. Abdominal pain started on Monday and occurs multiple times a day, lasting minutes to an hour. Pain is severe/unbearable when it occurs.  Associated bloating and mild nausea. No vomiting, diarrhea, fevers, or chills.    Evaluation including labs, CT abdomen pelvis, and RUQ ultrasound generally unremarkable.  Prescribed Bentyl, Zofran, sucralfate, and Norco on discharge. Unfortunately no significant improvement in symptoms with these or OTC analgesics. Personally reviewed CT scan with patient - there is evidence of moderate stool burden. This may be causing or contributing to her symptoms.    OBJECTIVE                                                      /66   Pulse 74   Temp 96.8  F (36  C) (Temporal)   Resp 19   Ht 1.753 m (5' 9\")   Wt 90.6 kg (199 lb 12.8 oz)   LMP  (LMP Unknown)   SpO2 95%   BMI 29.51 kg/m    Constitutional: well-appearing  Gastrointestinal: soft, non-tender to palpation, and non-distended; no organomegaly or masses  Psych: normal judgment and insight; normal mood and affect; recent and remote memory intact    ---    (Note documentation was completed, in part, with Dragon voice-recognition " software. Documentation was reviewed, but some grammatical, spelling, and word errors may remain.)

## 2023-05-17 NOTE — PATIENT INSTRUCTIONS
Please  stool kit in the lab today. Please complete and return when able,     ---    Recommend a bowel cleanse as follows:    Mix an ENTIRE 8.3oz bottle of Miralax (available over the counter) with 64oz of Gatorade. Drink 1 cup of this mixture every 15-30 minutes (as tolerated) until gone. You will have diarrhea. If your stools are not clear or yellow by the end of the first day, repeat again.     ---    If no improvement with cleanse, please let me know.

## 2023-05-18 NOTE — PROGRESS NOTES
Clinic Care Coordination Contact  Community Health Worker Initial Outreach    CHW Initial Information Gathering:  Referral Source: ED Follow-Up  Preferred Hospital: [facility] [place]  [phone]  Current living arrangement:: I live alone (Pt is currently in the process of moving. She has a roomate but once she is done moving she will live alone)  Type of residence:: Apartment  Community Resources:  (Outpatient Therapy through the [facility])  Supplies Currently Used at Home: None  Equipment Currently Used at Home: none  Informal Support system:: Friends  No PCP office visit in Past Year: No  Transportation means::  (Unable to assess)  CHW Additional Questions  If ED/Hospital discharge, follow-up appointment scheduled as recommended?: Yes  Medication changes made following ED/Hospital discharge?: Yes, patient to be scheduled with CC RN/SW within approx 1 business day  Kjhart active?: Yes  Patient sent Social Determinants of Health questionnaire?: Yes    Patient accepts CC: Yes. Patient scheduled for assessment with SW CC on 05/24/2023 at 9:00 AM. Patient noted desire to discuss financial resources. She has had to miss work due to health concerns lately.     Saritha Calderon  Community Health Worker  Connected Care Resource [place], [facility]  Ph: [phone]

## 2023-05-19 ENCOUNTER — TELEPHONE (OUTPATIENT)
Dept: INTERNAL MEDICINE | Facility: CLINIC | Age: 23
End: 2023-05-19
Payer: COMMERCIAL

## 2023-05-19 LAB — H PYLORI AG STL QL IA: NEGATIVE

## 2023-05-19 NOTE — TELEPHONE ENCOUNTER
I understand that she is having abdominal pain, but her work-up to date does not support a work absence.

## 2023-05-19 NOTE — TELEPHONE ENCOUNTER
Pt called follow up from recent visit     States she was having upper abdominal pain, in hospital, scans showed she was backed up    Finished taking Miralax OTC as directed     Dropped off stool sample yesterday - H. Pylori is in process       Did have a BM after taking the whole MiraLax bottle 8.3 oz, and 64 oz Gatorade     Now: feeling tender, relying on pain medications. Does not think BMs are normal clear/yellow yet, thinks she is still a bit constipated but can move around a little bit more than previous     Asking if PCP advises she repeat Miralax at this time?     Gertrude MORALES, Triage RN  [facility]

## 2023-05-19 NOTE — TELEPHONE ENCOUNTER
Spoke to patient to relay message. Patient verbalized understanding and agrees to plan.     Per 5/18/23 Tele Enc., patient is asking how long she should anticipate her work absence? Also requesting a letter for work absence to be posted via .

## 2023-05-19 NOTE — TELEPHONE ENCOUNTER
Per her AVS instructions, she is to repeat the cleanse.    Instructions that were given in AVS:    Recommend a bowel cleanse as follows:    Mix an ENTIRE 8.3oz bottle of Miralax (available over the counter) with 64oz of Gatorade. Drink 1 cup of this mixture every 15-30 minutes (as tolerated) until gone. You will have diarrhea. If your stools are not clear or yellow by the end of the first day, repeat again.

## 2023-05-24 ENCOUNTER — PATIENT OUTREACH (OUTPATIENT)
Dept: CARE COORDINATION | Facility: CLINIC | Age: 23
End: 2023-05-24

## 2023-05-24 NOTE — PROGRESS NOTES
Clinic Care Coordination Contact  [facility]/Voicemail    Clinical Data: Care Coordinator Outreach  Outreach attempted x 2. Left message on patient's voicemail with call back information and requested return call.  Plan: Care Coordinator will try to reach patient again in 1-2 business days.    LY Castano/[facility]  Social Work Care Coordinator  [facility] - [place], [place], [facility], and [place]  Phone: [phone]

## 2023-05-25 ENCOUNTER — PATIENT OUTREACH (OUTPATIENT)
Dept: CARE COORDINATION | Facility: CLINIC | Age: 23
End: 2023-05-25
Payer: COMMERCIAL

## 2023-05-25 NOTE — LETTER
[facility] CARE COORDINATION  [address] [place] [address]    May 25, 2023    Saritha Ferrera  [address]  [place] [address]      Dear Saritha,    I am a clinic care coordinator who works with Vita Ren MD with the [facility]. I have been trying to reach you recently to introduce Clinic Care Coordination. Below is a description of clinic care coordination and how I can further assist you shall any needs arise.        The clinic care coordination team is made up of a registered nurse, , financial resource worker and community health worker who understand the health care system. The goal of clinic care coordination is to help you manage your health and improve access to the health care system. Our team works alongside your provider to assist you in determining your health and social needs. We can help you obtain health care and community resources, providing you with necessary information and education. We can work with you through any barriers and develop a care plan that helps coordinate and strengthen the communication between you and your care team.  Our services are voluntary and are offered without charge to you personally.    Please feel free to contact me with any questions or concerns regarding care coordination and what we can offer.      We are focused on providing you with the highest-quality healthcare experience possible.    Sincerely,     LY Castano/MOLLY  Social Work Care Coordinator  [facility] - [place], [place], [facility], and [place]  Phone: [phone]

## 2023-05-25 NOTE — PROGRESS NOTES
Clinic Care Coordination Contact  [facility]/Voicemail    Clinical Data: Care Coordinator Outreach  Outreach attempted x 3. Left message on patient's voicemail with call back information and requested return call.  Plan: Care Coordinator will send care coordination introduction letter with care coordinator contact information and explanation of care coordination services via [employer]. Care Coordinator will do no further outreaches at this time.    LY Castano/[facility]  Social Work Care Coordinator  [facility] - [place], [place], [facility], and [place]  Phone: [phone]

## 2023-08-09 ENCOUNTER — OFFICE VISIT (OUTPATIENT)
Dept: OBGYN | Facility: CLINIC | Age: 23
End: 2023-08-09
Attending: REGISTERED NURSE
Payer: COMMERCIAL

## 2023-08-09 VITALS
HEART RATE: 69 BPM | BODY MASS INDEX: 31.8 KG/M2 | DIASTOLIC BLOOD PRESSURE: 70 MMHG | SYSTOLIC BLOOD PRESSURE: 102 MMHG | HEIGHT: 69 IN | WEIGHT: 214.7 LBS

## 2023-08-09 DIAGNOSIS — Z30.09 BIRTH CONTROL COUNSELING: Primary | ICD-10-CM

## 2023-08-09 DIAGNOSIS — Z30.09 COUNSELING FOR INITIATION OF BIRTH CONTROL METHOD: ICD-10-CM

## 2023-08-09 LAB
HCG UR QL: NEGATIVE
INTERNAL QC OK POCT: NORMAL
POCT KIT EXPIRATION DATE: NORMAL
POCT KIT LOT NUMBER: NORMAL

## 2023-08-09 PROCEDURE — 96372 THER/PROPH/DIAG INJ SC/IM: CPT | Performed by: REGISTERED NURSE

## 2023-08-09 PROCEDURE — 250N000011 HC RX IP 250 OP 636: Mod: JZ | Performed by: REGISTERED NURSE

## 2023-08-09 PROCEDURE — 99213 OFFICE O/P EST LOW 20 MIN: CPT | Performed by: REGISTERED NURSE

## 2023-08-09 PROCEDURE — G0463 HOSPITAL OUTPT CLINIC VISIT: HCPCS | Mod: 25 | Performed by: REGISTERED NURSE

## 2023-08-09 PROCEDURE — G0463 HOSPITAL OUTPT CLINIC VISIT: HCPCS | Performed by: REGISTERED NURSE

## 2023-08-09 PROCEDURE — 81025 URINE PREGNANCY TEST: CPT | Performed by: REGISTERED NURSE

## 2023-08-09 RX ORDER — MEDROXYPROGESTERONE ACETATE 150 MG/ML
150 INJECTION, SUSPENSION INTRAMUSCULAR
Status: COMPLETED | OUTPATIENT
Start: 2023-08-09 | End: 2024-04-05

## 2023-08-09 RX ADMIN — MEDROXYPROGESTERONE ACETATE 150 MG: 150 INJECTION, SUSPENSION INTRAMUSCULAR at 16:25

## 2023-08-09 ASSESSMENT — ANXIETY QUESTIONNAIRES
3. WORRYING TOO MUCH ABOUT DIFFERENT THINGS: SEVERAL DAYS
GAD7 TOTAL SCORE: 10
7. FEELING AFRAID AS IF SOMETHING AWFUL MIGHT HAPPEN: NOT AT ALL
2. NOT BEING ABLE TO STOP OR CONTROL WORRYING: NOT AT ALL
6. BECOMING EASILY ANNOYED OR IRRITABLE: NEARLY EVERY DAY
1. FEELING NERVOUS, ANXIOUS, OR ON EDGE: MORE THAN HALF THE DAYS
5. BEING SO RESTLESS THAT IT IS HARD TO SIT STILL: MORE THAN HALF THE DAYS
GAD7 TOTAL SCORE: 10

## 2023-08-09 ASSESSMENT — PATIENT HEALTH QUESTIONNAIRE - PHQ9
5. POOR APPETITE OR OVEREATING: MORE THAN HALF THE DAYS
SUM OF ALL RESPONSES TO PHQ QUESTIONS 1-9: 11

## 2023-08-09 NOTE — NURSING NOTE
Chief Complaint   Patient presents with    Consult For     Irregular periods after taking a pause on depo inj

## 2023-08-09 NOTE — NURSING NOTE

## 2023-08-09 NOTE — LETTER
"2023       RE: Saritha Ferrera  [address]  [place] [address]     Dear Colleague,    Thank you for referring your patient, Saritha Ferrera, to the [facility] [place] at [facility]. Please see a copy of my visit note below.    [facility]  Gynecology Visit    Reason for Visit: Irregular bleeding     HPI:    Saritha Ferrera is a 23 year old , here for irregular bleeding. Patient is on Depo but missed her dose in April (last dose was in ). She had her period Melissa 3-6, July 1-3 and again - . She reports heavy and painful vaginal bleeding. She has used plan B and condoms as back up birth control. She wants to restart her Depo. She has tried other options including IUD, which she reports bleeding for 2 weeks and a bicornuate uterus. She does not want estrogen related contraception due to her history of smoking.     GYN History  Age at menarche: 11  Length of menstrual cycle: irregular periods on depo   Duration of menses: irregular   Heavy bleeding: Yes  Pain with menses: yes; remedies tried nothing   LMP: 23  Sexually active: yes, with long term partner currently   History of STIs: none  Pap Smears: Due for one, Last was  NIL  History of abnormal paps: None  Contraception: Depo    PMH: Notable for Migraines with aura, PTSD, MDD, BRUNO  PSH: Reviewed  Current Meds: Wellbutrin, Norco, propranolol ER, spironolactone, sucralfate, meddroxyprogestrone   Allergies: NKDA  Family HX: notable for ovarian cancer in mom  Social HX: notable for nicotine use (1 cig/day)    ROS: 10-Point ROS negative except as noted in HPI    Physical Exam  Ht 1.753 m (5' 9\")   LMP 2023 (Exact Date)   BMI 29.51 kg/m    Gen: Well-appearing, NAD  HEENT: Normocephalic, atraumatic  CV:  Well perfused   Pulm: Normal work of breathing on room air    Lab  POCT pregnancy: Negative     Assessment/Plan:  Saritha Ferrera " is a 23 year old  female here for irregular bleeding. Discussed with patient Depo can cause irregular bleeding  Discussed options for birth control including Nexplanon. Discussed risk for bone loss and weight gain on Depo and need for multivitamins (including vitamin D) and DEXA scan if history of bone fracture. Patient expressed understanding and desires Depo today.     Contraception   -Depo shot given today.   - Advised to follow up in 12-14 weeks for next dose.     Return to clinic for annual exam.     Staffed with RICCO Eric CNM, MD MPH  Obstetric & Gynecology, PGY-1  2023 , 3:39 PM         Patient was seen with student who was present for learning. I agree with the history and ROS as completed by Kate Lombardi except for changes made by me. The remainder of the encounter was performed by me and scribed by Kate Lombardi. The scribed note accurately reflects my personal assessment, examination and clinical decisions.     RICCO Eric CNM

## 2023-08-09 NOTE — PROGRESS NOTES
"[facility]  Gynecology Visit    Reason for Visit: Irregular bleeding     HPI:    Saritha Ferrera is a 23 year old , here for irregular bleeding. Patient is on Depo but missed her dose in April (last dose was in ). She had her period Melissa 3-6, July 1-3 and again - . She reports heavy and painful vaginal bleeding. She has used plan B and condoms as back up birth control. She wants to restart her Depo. She has tried other options including IUD, which she reports bleeding for 2 weeks and a bicornuate uterus. She does not want estrogen related contraception due to her history of smoking.     GYN History  Age at menarche: 11  Length of menstrual cycle: irregular periods on depo   Duration of menses: irregular   Heavy bleeding: Yes  Pain with menses: yes; remedies tried nothing   LMP: 23  Sexually active: yes, with long term partner currently   History of STIs: none  Pap Smears: Due for one, Last was  NIL  History of abnormal paps: None  Contraception: Depo    PMH: Notable for Migraines with aura, PTSD, MDD, BRUNO  PSH: Reviewed  Current Meds: Wellbutrin, Norco, propranolol ER, spironolactone, sucralfate, meddroxyprogestrone   Allergies: NKDA  Family HX: notable for ovarian cancer in mom  Social HX: notable for nicotine use (1 cig/day)    ROS: 10-Point ROS negative except as noted in HPI    Physical Exam  Ht 1.753 m (5' 9\")   LMP 2023 (Exact Date)   BMI 29.51 kg/m    Gen: Well-appearing, NAD  HEENT: Normocephalic, atraumatic  CV:  Well perfused   Pulm: Normal work of breathing on room air    Lab  POCT pregnancy: Negative     Assessment/Plan:  Saritha Ferrera is a 23 year old  female here for irregular bleeding. Discussed with patient Depo can cause irregular bleeding  Discussed options for birth control including Nexplanon. Discussed risk for bone loss and weight gain on Depo and need for multivitamins (including vitamin D) and DEXA scan if history of bone " fracture. Patient expressed understanding and desires Depo today.     Contraception   -Depo shot given today.   - Advised to follow up in 12-14 weeks for next dose.     Return to clinic for annual exam.     Staffed with RICCO Eric CNM, MD MPH  Obstetric & Gynecology, PGY-1  August 9, 2023 , 3:39 PM         Patient was seen with student who was present for learning. I agree with the history and ROS as completed by Kate Lombardi except for changes made by me. The remainder of the encounter was performed by me and scribed by Kate Lombardi. The scribed note accurately reflects my personal assessment, examination and clinical decisions.     RICCO Eric CNM

## 2023-08-09 NOTE — PATIENT INSTRUCTIONS
Thank you for trusting us with your care!     If you need to contact us for questions about:  Symptoms, Scheduling & Medical Questions; Non-urgent (2-3 day response) Tonja message, Urgent (needing response today) [phone] (if after 3:30pm next day response)   Prescriptions: Please call your Pharmacy   Billing: Jacey [phone] or [facility]:[phone]

## 2023-09-25 ENCOUNTER — OFFICE VISIT (OUTPATIENT)
Dept: MIDWIFE SERVICES | Facility: CLINIC | Age: 23
End: 2023-09-25
Payer: COMMERCIAL

## 2023-09-25 VITALS — DIASTOLIC BLOOD PRESSURE: 64 MMHG | SYSTOLIC BLOOD PRESSURE: 100 MMHG | BODY MASS INDEX: 31.01 KG/M2 | WEIGHT: 210 LBS

## 2023-09-25 DIAGNOSIS — N92.6 IRREGULAR BLEEDING: Primary | ICD-10-CM

## 2023-09-25 DIAGNOSIS — Z12.4 SCREENING FOR MALIGNANT NEOPLASM OF CERVIX: ICD-10-CM

## 2023-09-25 DIAGNOSIS — Z11.3 ROUTINE SCREENING FOR STI (SEXUALLY TRANSMITTED INFECTION): ICD-10-CM

## 2023-09-25 LAB
CLUE CELLS: PRESENT
HCG UR QL: NEGATIVE
INTERNAL QC OK POCT: NORMAL
POCT KIT EXPIRATION DATE: NORMAL
POCT KIT LOT NUMBER: NORMAL
TRICHOMONAS, WET PREP: ABNORMAL
WBC'S/HIGH POWER FIELD, WET PREP: ABNORMAL
YEAST, WET PREP: ABNORMAL

## 2023-09-25 PROCEDURE — G0145 SCR C/V CYTO,THINLAYER,RESCR: HCPCS | Performed by: ADVANCED PRACTICE MIDWIFE

## 2023-09-25 PROCEDURE — 87491 CHLMYD TRACH DNA AMP PROBE: CPT | Performed by: ADVANCED PRACTICE MIDWIFE

## 2023-09-25 PROCEDURE — 87591 N.GONORRHOEAE DNA AMP PROB: CPT | Performed by: ADVANCED PRACTICE MIDWIFE

## 2023-09-25 PROCEDURE — 81025 URINE PREGNANCY TEST: CPT | Performed by: ADVANCED PRACTICE MIDWIFE

## 2023-09-25 PROCEDURE — 99204 OFFICE O/P NEW MOD 45 MIN: CPT | Performed by: ADVANCED PRACTICE MIDWIFE

## 2023-09-25 PROCEDURE — 87210 SMEAR WET MOUNT SALINE/INK: CPT | Performed by: ADVANCED PRACTICE MIDWIFE

## 2023-09-25 NOTE — PROGRESS NOTES
SUBJECTIVE:                                                   Saritha Ferrera is a 23 year old who presents to clinic today for the following health issue(s):  Patient presents with:  Vaginal Bleeding: Has not stopped bleeding since depo shot (@ [facility] clinic)      HPI:  States she has used depo for about 10 yrs, and typically had no issues with bleeding. She took a break from April to August, and was having irregular bleeding, so decided to restart depo in August.   Since then , she has had irregular heavy bleeding and cramping.   She states bleeding did resolve  3 days ago.     Patient's last menstrual period was 2023 (exact date).  Menstrual History: irregular   Patient is sexually active  .  Using depo or other injectable for contraception.   STI infx testing offered:  Accepted    Last PHQ-9 score on record =       2023     3:44 PM   PHQ-9 SCORE   PHQ-9 Total Score 11     Last GAD7 score on record =       2023     3:44 PM   BRUNO-7 SCORE   Total Score 10         Problem list and histories reviewed & adjusted, as indicated.  Additional history: as documented.    Patient Active Problem List   Diagnosis    Adjustment disorder with depressed mood    Contraception management    Migraine with aura    Major depressive disorder, recurrent episode, moderate (H)    BRUNO (generalized anxiety disorder)    Cannabis abuse, daily use    Eating disorder    Suicidal behavior without attempted self-injury    Severe episode of recurrent major depressive disorder, without psychotic features (H)    PTSD (post-traumatic stress disorder)     Past Surgical History:   Procedure Laterality Date    HC TOOTH EXTRACTION W/FORCEP      NO HISTORY OF SURGERY        Social History     Tobacco Use    Smoking status: Light Smoker     Types: Cigarettes    Smokeless tobacco: Never    Tobacco comments:     1 cig/day   Substance Use Topics    Alcohol use: Yes     Alcohol/week: 0.0 standard drinks of alcohol     Comment: Socially  drinking      Problem (# of Occurrences) Relation (Name,Age of Onset)    Anxiety Disorder (3) Sister, Sister, Father    Substance Abuse (4) Mother, Sister, Sister, Father    Mental Illness (2) Mother, Father    Alcohol/Drug (1) Sister    Cancer (2) Mother: Ovarian CA, Paternal Aunt    Depression (2) Father, Brother    Other - See Comments (2) Mother: Anorexia, depression, Sister: anger issues              Current Outpatient Medications   Medication Sig    buPROPion (WELLBUTRIN XL) 300 MG 24 hr tablet Take 1 tablet by mouth every morning    propranolol ER (INDERAL LA) 60 MG 24 hr capsule Take 1 capsule (60 mg) by mouth daily    spironolactone (ALDACTONE) 100 MG tablet Take 100 mg by mouth daily    HYDROcodone-acetaminophen (NORCO) 5-325 MG tablet Take 1 tablet by mouth every 6 hours as needed for pain (Patient not taking: Reported on 8/9/2023)     Current Facility-Administered Medications   Medication    medroxyPROGESTERone (DEPO-PROVERA) injection 150 mg    medroxyPROGESTERone (DEPO-PROVERA) syringe 150 mg     No Known Allergies    ROS:   ROS: 10 point ROS neg other than the symptoms noted above in the HPI.     OBJECTIVE:     /64   Wt 95.3 kg (210 lb)   LMP 07/17/2023 (Exact Date)   BMI 31.01 kg/m    Body mass index is 31.01 kg/m .    PHYSICAL EXAM:  Constitutional:  Appearance: Well nourished, well developed alert, in no acute distress  Skin: General Inspection:  No rashes present, no lesions present, no areas of discoloration.  Neurologic:  Mental Status:  Oriented X3.  Normal strength and tone, sensory exam grossly normal, mentation intact and speech normal.    Psychiatric:  Mentation appears normal and affect normal/bright.   Pelvic Exam:  Vulva: No external lesions, normal hair distribution, no adenopathy  Vagina: Moist, pink, no abnormal discharge, well rugated, no lesions  Cervix: Pap smear is taken, parous, smooth, pink, no visible lesions  Uterus: Normal size, anteverted, non-tender,  mobile  Ovaries: No mass, non-tender, mobile  Rectal exam: No mass, non-tender, normal sphincter tone, hemoccult negative     In-Clinic Test Results:  No results found for this or any previous visit (from the past 24 hour(s)).    ASSESSMENT/PLAN:                                                        ICD-10-CM    1. Irregular bleeding  N92.6 NEISSERIA GONORRHOEA PCR     CHLAMYDIA TRACHOMATIS PCR     Wet preparation     HCG qualitative urine POCT, Enter/Edit Result      2. Screening for malignant neoplasm of cervix  Z12.4 Pap imaged thin layer screen reflex to HPV if ASCUS - recommend age 25 - 29      3. Routine screening for STI (sexually transmitted infection)  Z11.3 NEISSERIA GONORRHOEA PCR     CHLAMYDIA TRACHOMATIS PCR     Wet preparation        - Discussed that irregular bleeding can be common with depo-provera. It's reassuring that bleeding has stopped over last few days. Will rule out infection/pregnancy as bleeding cause. If labs wnl, discussed option to change birth control method. There is an option to add estrogen, however pt would not be a good  candidate d/t history migraine with aura.   - Discussed bleeding may regulate over the next few weeks/mo. Follow up with continued concerns.

## 2023-09-25 NOTE — NURSING NOTE
"Chief Complaint   Patient presents with    Vaginal Bleeding     Has not stopped bleeding since depo shot (@ [facility])       Initial /64   Wt 95.3 kg (210 lb)   LMP 2023 (Exact Date)   BMI 31.01 kg/m   Estimated body mass index is 31.01 kg/m  as calculated from the following:    Height as of 23: 1.753 m (5' 9\").    Weight as of this encounter: 95.3 kg (210 lb).  BP completed using cuff size: regular    Questioned patient about current smoking habits.  Pt. currently smokes.  Advised about smoking cessation.          Rubina Caldera LPN on 2023 at 10:54 AM           "

## 2023-09-26 LAB
C TRACH DNA SPEC QL NAA+PROBE: NEGATIVE
N GONORRHOEA DNA SPEC QL NAA+PROBE: NEGATIVE

## 2023-09-28 LAB
BKR LAB AP GYN ADEQUACY: NORMAL
BKR LAB AP GYN INTERPRETATION: NORMAL
BKR LAB AP HPV REFLEX: NORMAL
BKR LAB AP LMP: NORMAL
BKR LAB AP PREVIOUS ABNORMAL: NORMAL
PATH REPORT.COMMENTS IMP SPEC: NORMAL
PATH REPORT.COMMENTS IMP SPEC: NORMAL
PATH REPORT.RELEVANT HX SPEC: NORMAL

## 2023-10-16 ENCOUNTER — OFFICE VISIT (OUTPATIENT)
Dept: URGENT CARE | Facility: URGENT CARE | Age: 23
End: 2023-10-16
Payer: COMMERCIAL

## 2023-10-16 VITALS
RESPIRATION RATE: 16 BRPM | TEMPERATURE: 96.8 F | OXYGEN SATURATION: 100 % | DIASTOLIC BLOOD PRESSURE: 62 MMHG | SYSTOLIC BLOOD PRESSURE: 100 MMHG | HEART RATE: 63 BPM

## 2023-10-16 DIAGNOSIS — R07.0 THROAT PAIN: Primary | ICD-10-CM

## 2023-10-16 LAB
DEPRECATED S PYO AG THROAT QL EIA: NEGATIVE
GROUP A STREP BY PCR: NOT DETECTED

## 2023-10-16 PROCEDURE — 99214 OFFICE O/P EST MOD 30 MIN: CPT | Performed by: PHYSICIAN ASSISTANT

## 2023-10-16 PROCEDURE — 87651 STREP A DNA AMP PROBE: CPT | Performed by: PHYSICIAN ASSISTANT

## 2023-10-16 RX ORDER — IBUPROFEN 800 MG/1
800 TABLET, FILM COATED ORAL EVERY 8 HOURS PRN
Qty: 30 TABLET | Refills: 0 | Status: SHIPPED | OUTPATIENT
Start: 2023-10-16 | End: 2024-10-15

## 2023-10-16 NOTE — PROGRESS NOTES
"  Assessment & Plan     Throat pain    You had a strep test done today that was neg.  We will perform a strep culture and if any positive results come back we will call you and call in antibiotics.  At this time, this appears to be a viral infection and requires symptomatic treatment.  Most viral sore throats will resolve with in a few days.  If your throat pain worsens then please be  rechecked in the  or by your PCP.    Strep neg, culture pending  Motrin and phenol    - Streptococcus A Rapid Screen w/Reflex to PCR  - Group A Streptococcus PCR Throat Swab  - ibuprofen (ADVIL/MOTRIN) 800 MG tablet; Take 1 tablet (800 mg) by mouth every 8 hours as needed  - phenol (CHLORASEPTIC) 1.4 % spray; Take 1 spray (1 mL) by mouth every hour as needed for sore throat     BMI:   Estimated body mass index is 31.01 kg/m  as calculated from the following:    Height as of 8/9/23: 1.753 m (5' 9\").    Weight as of 9/25/23: 95.3 kg (210 lb).       At today's visit with Saritha Ferrera , we discussed results, diagnosis, medications and formulated a plan.  We also discussed red flags for immediate return to clinic/ER, as well as indications for follow up with PCP if not improved in 3 days. Patient understood and agreed to plan. Saritha Ferrera was discharged with stable vitals and has no further questions.       No follow-ups on file.    Alvaro Casillas, [facility], PA-C  [facility] is a 23 year old, presenting for the following health issues:  Pharyngitis (Sore throat, minor headache, vomited today, R ear pain X 2 days )      HPI   Review of Systems   Constitutional, HEENT, cardiovascular, pulmonary, gi and gu systems are negative, except as otherwise noted.      Objective    /62   Pulse 63   Temp 96.8  F (36  C) (Tympanic)   Resp 16   LMP 07/17/2023 (Exact Date)   SpO2 100%   There is no height or weight on file to calculate BMI.  Physical Exam   GENERAL: healthy, alert and no " distress  EYES: Eyes grossly normal to inspection, PERRL and conjunctivae and sclerae normal  HENT: normal cephalic/atraumatic, ear canals and TM's normal, nose and mouth without ulcers or lesions, and tonsillar erythema  NECK: no adenopathy, no asymmetry, masses, or scars and thyroid normal to palpation  MS: no gross musculoskeletal defects noted, no edema  SKIN: no suspicious lesions or rashes  NEURO: Normal strength and tone, mentation intact and speech normal  PSYCH: mentation appears normal, affect normal/bright      Results for orders placed or performed in visit on 10/16/23   Streptococcus A Rapid Screen w/Reflex to PCR     Status: Normal    Specimen: Throat; Swab   Result Value Ref Range    Group A Strep antigen Negative Negative

## 2023-10-25 ENCOUNTER — TRANSFERRED RECORDS (OUTPATIENT)
Dept: HEALTH INFORMATION MANAGEMENT | Facility: CLINIC | Age: 23
End: 2023-10-25
Payer: COMMERCIAL

## 2023-10-26 ENCOUNTER — ALLIED HEALTH/NURSE VISIT (OUTPATIENT)
Dept: OBGYN | Facility: CLINIC | Age: 23
End: 2023-10-26
Payer: COMMERCIAL

## 2023-10-26 VITALS — BODY MASS INDEX: 31.25 KG/M2 | SYSTOLIC BLOOD PRESSURE: 98 MMHG | DIASTOLIC BLOOD PRESSURE: 62 MMHG | WEIGHT: 211.6 LBS

## 2023-10-26 DIAGNOSIS — Z30.42 ENCOUNTER FOR SURVEILLANCE OF INJECTABLE CONTRACEPTIVE: Primary | ICD-10-CM

## 2023-10-26 PROCEDURE — 96372 THER/PROPH/DIAG INJ SC/IM: CPT | Performed by: REGISTERED NURSE

## 2023-10-26 RX ADMIN — MEDROXYPROGESTERONE ACETATE 150 MG: 150 INJECTION, SUSPENSION INTRAMUSCULAR at 13:49

## 2023-10-26 NOTE — NURSING NOTE
"Chief Complaint   Patient presents with    Allied Health Visit     Depo Inj  Last dose 2023  Within dates          Initial BP 98/62 (BP Location: Left arm, Cuff Size: Adult Regular)   Wt 96 kg (211 lb 9.6 oz)   LMP 2023 (Approximate)   BMI 31.25 kg/m   Estimated body mass index is 31.25 kg/m  as calculated from the following:    Height as of 23: 1.753 m (5' 9\").    Weight as of this encounter: 96 kg (211 lb 9.6 oz).  BP completed using cuff size: regular    Questioned patient about current smoking habits.  Pt. currently smokes.  Advised about smoking cessation.          The following HM Due: NONE    Clinic Administered Medication Documentation      Depo Provera Documentation    Depo-Provera Standing Order inclusion/exclusion criteria reviewed.     Is this the initial or subsequent dose of Depo Provera? Subsequent dose - patient is within the acceptable window of time (11-15 weeks) for subsequent injection. Pregnancy test not indicated.    Patient meets: inclusion criteria     Is there an active order (written within the past 365 days, with administrations remaining, not ) in the chart? Yes.     Prior to injection, verified patient identity using patient's name and date of birth. Medication was administered. Please see MAR and medication order for additional information.     Vial/Syringe: Single dose vial. Was entire vial of medication used? Yes    Patient instructed to remain in clinic for 15 minutes and report any adverse reaction to staff immediately.  GIVEN : RUG     NEXT INJECTION DUE: 24 - 2024        Verified that the patient has refills remaining in their prescription.       Snow Song, LORE on 10/26/2023 at 1:51 PM         "

## 2024-01-19 ENCOUNTER — ALLIED HEALTH/NURSE VISIT (OUTPATIENT)
Dept: OBGYN | Facility: CLINIC | Age: 24
End: 2024-01-19
Attending: REGISTERED NURSE
Payer: COMMERCIAL

## 2024-01-19 DIAGNOSIS — Z30.42 ENCOUNTER FOR SURVEILLANCE OF INJECTABLE CONTRACEPTIVE: Primary | ICD-10-CM

## 2024-01-19 PROCEDURE — 96372 THER/PROPH/DIAG INJ SC/IM: CPT | Performed by: REGISTERED NURSE

## 2024-01-19 PROCEDURE — 250N000011 HC RX IP 250 OP 636: Mod: JZ | Performed by: REGISTERED NURSE

## 2024-01-19 RX ADMIN — MEDROXYPROGESTERONE ACETATE 150 MG: 150 INJECTION, SUSPENSION INTRAMUSCULAR at 12:56

## 2024-01-19 NOTE — PATIENT INSTRUCTIONS
Thank you for trusting us with your care!     If you need to contact us for questions about:  Symptoms, Scheduling & Medical Questions; Non-urgent (2-3 day response) Tonja message, Urgent (needing response today) [phone] (if after 3:30pm next day response)   Prescriptions: Please call your Pharmacy   Billing: Jacey [phone] or [facility]:[phone]

## 2024-01-19 NOTE — NURSING NOTE
Chief Complaint   Patient presents with    Allied Health Visit     Depo-provera     Clinic Administered Medication Documentation      Depo Provera Documentation    Depo-Provera Standing Order inclusion/exclusion criteria reviewed.     Is this the initial or subsequent dose of Depo Provera? Subsequent dose - patient is within the acceptable window of time (11-15 weeks) for subsequent injection. Pregnancy test not indicated.    Patient meets: inclusion criteria     Is there an active order (written within the past 365 days, with administrations remaining, not ) in the chart? Yes.     Prior to injection, verified patient identity using patient's name and date of birth. Medication was administered. Please see MAR and medication order for additional information.     Vial/Syringe: Single dose vial. Was entire vial of medication used? Yes    Patient instructed to report any adverse reaction to staff immediately.  NEXT INJECTION DUE: 24 - 5/3/24    Verified that the patient has refills remaining in their prescription.

## 2024-04-05 ENCOUNTER — ALLIED HEALTH/NURSE VISIT (OUTPATIENT)
Dept: OBGYN | Facility: CLINIC | Age: 24
End: 2024-04-05
Payer: COMMERCIAL

## 2024-04-05 DIAGNOSIS — Z30.42 ENCOUNTER FOR SURVEILLANCE OF INJECTABLE CONTRACEPTIVE: Primary | ICD-10-CM

## 2024-04-05 PROCEDURE — 250N000011 HC RX IP 250 OP 636: Mod: JZ | Performed by: REGISTERED NURSE

## 2024-04-05 PROCEDURE — 96372 THER/PROPH/DIAG INJ SC/IM: CPT | Performed by: REGISTERED NURSE

## 2024-04-05 RX ADMIN — MEDROXYPROGESTERONE ACETATE 150 MG: 150 INJECTION, SUSPENSION INTRAMUSCULAR at 13:00

## 2024-04-05 NOTE — PATIENT INSTRUCTIONS
Thank you for trusting us with your care!     If you need to contact us for questions about:  Symptoms, Scheduling & Medical Questions; Non-urgent (2-3 day response) Tonja message, Urgent (needing response today) [phone] (if after 3:30pm next day response)   Prescriptions: Please call your Pharmacy   Billing: Jacey [phone] or [facility]:[phone]

## 2024-04-05 NOTE — NURSING NOTE
Chief Complaint   Patient presents with    Allied Health Visit     Depo-provera     Clinic Administered Medication Documentation      Depo Provera Documentation    Depo-Provera Standing Order inclusion/exclusion criteria reviewed.     Is this the initial or subsequent dose of Depo Provera? Subsequent dose - patient is within the acceptable window of time (11-15 weeks) for subsequent injection. Pregnancy test not indicated.    Patient meets: inclusion criteria     Is there an active order (written within the past 365 days, with administrations remaining, not ) in the chart? Yes.     Prior to injection, verified patient identity using patient's name and date of birth. Medication was administered. Please see MAR and medication order for additional information.     Vial/Syringe: Single dose vial. Was entire vial of medication used? Yes    Patient instructed to report any adverse reaction to staff immediately.  NEXT INJECTION DUE: 24 - 24    Verified that the patient has refills remaining in their prescription.

## 2024-04-17 ENCOUNTER — TRANSFERRED RECORDS (OUTPATIENT)
Dept: HEALTH INFORMATION MANAGEMENT | Facility: CLINIC | Age: 24
End: 2024-04-17
Payer: COMMERCIAL

## 2024-06-02 ENCOUNTER — HEALTH MAINTENANCE LETTER (OUTPATIENT)
Age: 24
End: 2024-06-02

## 2024-06-05 ENCOUNTER — TELEPHONE (OUTPATIENT)
Dept: PODIATRY | Facility: CLINIC | Age: 24
End: 2024-06-05
Payer: COMMERCIAL

## 2024-06-05 ENCOUNTER — OFFICE VISIT (OUTPATIENT)
Dept: URGENT CARE | Facility: URGENT CARE | Age: 24
End: 2024-06-05
Payer: COMMERCIAL

## 2024-06-05 VITALS
HEART RATE: 50 BPM | TEMPERATURE: 97.8 F | DIASTOLIC BLOOD PRESSURE: 79 MMHG | OXYGEN SATURATION: 97 % | SYSTOLIC BLOOD PRESSURE: 117 MMHG | RESPIRATION RATE: 14 BRPM

## 2024-06-05 DIAGNOSIS — L03.031 PARONYCHIA OF TOE, RIGHT: Primary | ICD-10-CM

## 2024-06-05 PROCEDURE — 99213 OFFICE O/P EST LOW 20 MIN: CPT | Mod: 25 | Performed by: PHYSICIAN ASSISTANT

## 2024-06-05 PROCEDURE — 10060 I&D ABSCESS SIMPLE/SINGLE: CPT | Mod: T5 | Performed by: PHYSICIAN ASSISTANT

## 2024-06-05 RX ORDER — CEPHALEXIN 500 MG/1
500 CAPSULE ORAL 3 TIMES DAILY
Qty: 21 CAPSULE | Refills: 0 | Status: SHIPPED | OUTPATIENT
Start: 2024-06-05 | End: 2024-06-12

## 2024-06-05 NOTE — PATIENT INSTRUCTIONS
(L03.031) Paronychia of toe, right  (primary encounter diagnosis)  Comment:   Plan: cephALEXin (KEFLEX) 500 MG capsule            Warm soaks in a surgical and solution, use about 3 to 4 drops in the been of warm water, soak for 20 minutes at least twice a day.  After soaking both cuticle back to drain any remaining pus.  Dressed with bacitracin and bandage.  Do this twice a day for the next 4 days or so.  Keep covered with a bandage or dressing during the day, remove only to clean and redress for the first 4 days thereafter you may leave open to the air as long as you are in a clear and clean environment.

## 2024-06-05 NOTE — TELEPHONE ENCOUNTER
Phone call to patient. She states for the past 2-3 days she has had some pus coming from her right great toenail and swelling. It is getting worse, making it difficult to walk.   Recommended she see her PCP or Urgent Care to potentially be put on antibiotics to help with the infection until she can get in to see Podiatry to have the ingrown nail removed. She verbalized understanding.     KARMA Mi RN

## 2024-06-05 NOTE — PROGRESS NOTES
Patient presents with:  Nail Problem: Patient here with concern of infected right foot great toe. States she has an appt with podiatry for this on 6/12 but is requesting abx prior to that appt.     (L03.031) Paronychia of toe, right  (primary encounter diagnosis)  Comment:   Plan: cephALEXin (KEFLEX) 500 MG capsule       DRAIN SKIN         ABSCESS SIMPLE/SINGLE            Warm soaks in a surgical and solution, use about 3 to 4 drops in the been of warm water, soak for 20 minutes at least twice a day.  After soaking both cuticle back to drain any remaining pus.  Dressed with bacitracin and bandage.  Do this twice a day for the next 4 days or so.    Keep covered with a bandage or dressing during the day, remove only to clean and redress for the first 4 days thereafter you may leave open to the air as long as you are in a clear and clean environment.      At the end of the encounter, I discussed results, diagnosis, medications. Discussed red flags for immediate return to clinic/ER, as well as indications for follow up if no improvement. Patient understood and agreed to plan. Patient was stable for discharge     Follow-up with podiatry should symptoms persist or worsen.    20 minutes spent by me on the date of the encounter doing chart review, patient visit, and documentation .  This time was in addition to the time spent during the procedure.    SUBJECTIVE:   Saritha Ferrera is a 24 year old female who presents today with a painful area of swelling next to her right great toe.  She states that yesterday it started to drain pus.  The area is still swollen.  She denies any fevers.  Denies any trauma.    She was able to make an appointment with podiatry for next week.    Patient Active Problem List   Diagnosis    Adjustment disorder with depressed mood    Contraception management    Migraine with aura    Major depressive disorder, recurrent episode, moderate (H)    BRUNO (generalized anxiety disorder)    Cannabis abuse,  daily use    Eating disorder    Suicidal behavior without attempted self-injury    Severe episode of recurrent major depressive disorder, without psychotic features (H)    PTSD (post-traumatic stress disorder)         Past Medical History:   Diagnosis Date    Adjustment disorder with depressed mood 11/4/2013    Depressive disorder     Hx of migraines     Migraine with aura 4/10/2015         Current Outpatient Medications   Medication Sig Dispense Refill    Multiple Vitamins-Iron (DAILY-TERESA/IRON/BETA-CAROTENE) TABS TAKE 1 TABLET BY MOUTH DAILY. (Patient not taking: Reported on 10/19/2020) 30 tablet 7     Social History     Tobacco Use    Smoking status: Never Smoker    Smokeless tobacco: Never Used   Substance Use Topics    Alcohol use: Not on file     Family History   Problem Relation Age of Onset    Diabetes Mother     Diabetes Father          ROS:    10 point ROS of systems including Constitutional, Eyes, Respiratory, Cardiovascular, Gastroenterology, Genitourinary, Integumentary, Muscularskeletal, Psychiatric ,neurological were all negative except for pertinent positives noted in my HPI       OBJECTIVE:  /79 (BP Location: Right arm, Patient Position: Sitting, Cuff Size: Adult Regular)   Pulse 50   Temp 97.8  F (36.6  C) (Tympanic)   Resp 14   SpO2 97%   Physical Exam:  GENERAL APPEARANCE: healthy, alert and no distress  Extremities: no peripheral edema or tenderness, peripheral pulses normal  SKIN: Erythematous and edematous skin at medial aspect of right great toenail.  Tender.    Procedure the foot was soaked in a warm surgical and solution for approximately 20 minutes.  It was removed from the solution, patted dry and then the area was cleaned with an alcohol prep pad.  #11 blade was used to make a small incision adjacent in the skin abutting the medial aspect of the nail.  Copious purulent material was drained.  The area was then cleaned again and dressed with bacitracin and a nonstick dressing.

## 2024-06-05 NOTE — TELEPHONE ENCOUNTER
Other: Pt is dealing with an infected ingrown toenail.  It started draining purulent discharge last night and she hasn't been full weight bearing for the last few days.  I have her scheduled for a procedure w/ Dr. Long at  on 06/12.  Please evaluate and contact patient if she needs to be seen sooner.  Thank      Could we send this information to you in [place] or would you prefer to receive a phone call?:   Patient would prefer a phone call   Okay to leave a detailed message?: Yes at Cell number on file:    Telephone Information:   Mobile [phone]

## 2024-07-12 ENCOUNTER — OFFICE VISIT (OUTPATIENT)
Dept: URGENT CARE | Facility: URGENT CARE | Age: 24
End: 2024-07-12
Payer: COMMERCIAL

## 2024-07-12 VITALS
DIASTOLIC BLOOD PRESSURE: 78 MMHG | RESPIRATION RATE: 12 BRPM | SYSTOLIC BLOOD PRESSURE: 135 MMHG | HEART RATE: 53 BPM | TEMPERATURE: 97.8 F | OXYGEN SATURATION: 96 %

## 2024-07-12 DIAGNOSIS — H02.843 SWELLING OF EYELID, RIGHT: ICD-10-CM

## 2024-07-12 DIAGNOSIS — R06.7 SNEEZING: ICD-10-CM

## 2024-07-12 DIAGNOSIS — H10.9 BACTERIAL CONJUNCTIVITIS OF RIGHT EYE: Primary | ICD-10-CM

## 2024-07-12 PROCEDURE — 99213 OFFICE O/P EST LOW 20 MIN: CPT | Performed by: PHYSICIAN ASSISTANT

## 2024-07-12 RX ORDER — CETIRIZINE HYDROCHLORIDE 10 MG/1
10 TABLET ORAL DAILY
Qty: 30 TABLET | Refills: 0 | Status: SHIPPED | OUTPATIENT
Start: 2024-07-12

## 2024-07-12 RX ORDER — TOBRAMYCIN 3 MG/ML
1-2 SOLUTION/ DROPS OPHTHALMIC EVERY 4 HOURS
Qty: 5 ML | Refills: 0 | Status: SHIPPED | OUTPATIENT
Start: 2024-07-12 | End: 2024-07-19

## 2024-07-12 NOTE — PROGRESS NOTES
Assessment & Plan     Bacterial conjunctivitis of right eye    You are being treated for bacterial conjunctivitis.  The most common symptoms of conjunctivitis include a thick, pus-like discharge from the eye, swollen eyelids, redness, eyelids sticking together upon awakening, and a gritty or scratchy feeling in the eye. You have been given an antibiotic eye drop to treat this infection.  With treatment, the infection takes about 7 days to clear up.   Home care  Use prescribed antibiotic eye drops or ointment as directed to treat the infection.  Apply a warm compress (towel soaked in warm water) to the affected eye 3 to 4 times a day. Do this just before applying medicine to the eye.  Use a warm, wet cloth to wipe away crusting of the eyelids in the morning. This is caused by mucus drainage during the night.     - tobramycin (TOBREX) 0.3 % ophthalmic solution  Dispense: 5 mL; Refill: 0    Swelling of eyelid, right    Cool compresses for swelling  Start zyrtec  - cetirizine (ZYRTEC) 10 MG tablet  Dispense: 30 tablet; Refill: 0    Sneezing    Zyrtec for sneezing  - cetirizine (ZYRTEC) 10 MG tablet  Dispense: 30 tablet; Refill: 0       No follow-ups on file.    Alvaro Casillas, [facility], [facility] URGENT [facility]IN is a 24 year old female who presents to clinic today for the following health issues:  Chief Complaint   Patient presents with    Eye Problem     Patient here with complaint of right eye irritation and drainage. Patient states she has also been sneezing. Symptoms started this morning.        HPI  Review of Systems  Constitutional, HEENT, cardiovascular, pulmonary, gi and gu systems are negative, except as otherwise noted.      Objective    /78 (BP Location: Left arm, Patient Position: Sitting, Cuff Size: Adult Large)   Pulse 53   Temp 97.8  F (36.6  C) (Tympanic)   Resp 12   SpO2 96%   Physical Exam   GENERAL: alert and no distress  EYES: pos for mild injection and  mattering right eye  HENT: ear canals and TM's normal, nose and mouth without ulcers or lesions  NECK: no adenopathy, no asymmetry, masses, or scars  SKIN: pos for mild eyelid edema right upper eyelid  NEURO: Normal strength and tone, mentation intact and speech normal  PSYCH: mentation appears normal, affect normal/bright

## 2024-07-18 ENCOUNTER — LAB (OUTPATIENT)
Dept: LAB | Facility: CLINIC | Age: 24
End: 2024-07-18
Attending: NURSE PRACTITIONER
Payer: COMMERCIAL

## 2024-07-18 ENCOUNTER — OFFICE VISIT (OUTPATIENT)
Dept: OBGYN | Facility: CLINIC | Age: 24
End: 2024-07-18
Attending: NURSE PRACTITIONER
Payer: COMMERCIAL

## 2024-07-18 VITALS
SYSTOLIC BLOOD PRESSURE: 118 MMHG | HEIGHT: 69 IN | BODY MASS INDEX: 31.25 KG/M2 | DIASTOLIC BLOOD PRESSURE: 76 MMHG | WEIGHT: 211 LBS | HEART RATE: 67 BPM

## 2024-07-18 DIAGNOSIS — Z11.3 SCREEN FOR STD (SEXUALLY TRANSMITTED DISEASE): ICD-10-CM

## 2024-07-18 DIAGNOSIS — F33.1 MAJOR DEPRESSIVE DISORDER, RECURRENT EPISODE, MODERATE (H): ICD-10-CM

## 2024-07-18 DIAGNOSIS — Z30.013 ENCOUNTER FOR PRESCRIPTION FOR DEPO-PROVERA: ICD-10-CM

## 2024-07-18 DIAGNOSIS — Z78.9 ALCOHOL USE: ICD-10-CM

## 2024-07-18 DIAGNOSIS — Z80.41 FAMILY HISTORY OF MALIGNANT NEOPLASM OF OVARY: ICD-10-CM

## 2024-07-18 DIAGNOSIS — Z13.220 SCREENING FOR LIPID DISORDERS: ICD-10-CM

## 2024-07-18 DIAGNOSIS — Z00.00 VISIT FOR PREVENTIVE HEALTH EXAMINATION: Primary | ICD-10-CM

## 2024-07-18 DIAGNOSIS — Z30.42 ENCOUNTER FOR SURVEILLANCE OF INJECTABLE CONTRACEPTIVE: ICD-10-CM

## 2024-07-18 DIAGNOSIS — Z00.00 VISIT FOR PREVENTIVE HEALTH EXAMINATION: ICD-10-CM

## 2024-07-18 PROBLEM — F10.20 ALCOHOL DEPENDENCE (H): Status: ACTIVE | Noted: 2024-07-18

## 2024-07-18 LAB
CHOLEST SERPL-MCNC: 175 MG/DL
FASTING STATUS PATIENT QL REPORTED: NO
HDLC SERPL-MCNC: 73 MG/DL
LDLC SERPL CALC-MCNC: 82 MG/DL
NONHDLC SERPL-MCNC: 102 MG/DL
TRIGL SERPL-MCNC: 100 MG/DL

## 2024-07-18 PROCEDURE — 87491 CHLMYD TRACH DNA AMP PROBE: CPT

## 2024-07-18 PROCEDURE — 250N000011 HC RX IP 250 OP 636: Performed by: NURSE PRACTITIONER

## 2024-07-18 PROCEDURE — 36415 COLL VENOUS BLD VENIPUNCTURE: CPT

## 2024-07-18 PROCEDURE — 96372 THER/PROPH/DIAG INJ SC/IM: CPT | Performed by: NURSE PRACTITIONER

## 2024-07-18 PROCEDURE — 87591 N.GONORRHOEAE DNA AMP PROB: CPT

## 2024-07-18 PROCEDURE — 99395 PREV VISIT EST AGE 18-39: CPT | Performed by: NURSE PRACTITIONER

## 2024-07-18 PROCEDURE — 80061 LIPID PANEL: CPT

## 2024-07-18 PROCEDURE — 99213 OFFICE O/P EST LOW 20 MIN: CPT | Performed by: NURSE PRACTITIONER

## 2024-07-18 RX ORDER — MEDROXYPROGESTERONE ACETATE 150 MG/ML
150 INJECTION, SUSPENSION INTRAMUSCULAR
Status: ACTIVE | OUTPATIENT
Start: 2024-07-18 | End: 2025-07-13

## 2024-07-18 RX ORDER — CLINDAMYCIN PHOSPHATE 10 UG/ML
LOTION TOPICAL
COMMUNITY
Start: 2024-03-05

## 2024-07-18 RX ADMIN — MEDROXYPROGESTERONE ACETATE 150 MG: 150 INJECTION, SUSPENSION INTRAMUSCULAR at 14:29

## 2024-07-18 ASSESSMENT — ANXIETY QUESTIONNAIRES
GAD7 TOTAL SCORE: 8
GAD7 TOTAL SCORE: 8
6. BECOMING EASILY ANNOYED OR IRRITABLE: SEVERAL DAYS
7. FEELING AFRAID AS IF SOMETHING AWFUL MIGHT HAPPEN: NOT AT ALL
2. NOT BEING ABLE TO STOP OR CONTROL WORRYING: NOT AT ALL
5. BEING SO RESTLESS THAT IT IS HARD TO SIT STILL: MORE THAN HALF THE DAYS
1. FEELING NERVOUS, ANXIOUS, OR ON EDGE: SEVERAL DAYS
3. WORRYING TOO MUCH ABOUT DIFFERENT THINGS: SEVERAL DAYS

## 2024-07-18 ASSESSMENT — PATIENT HEALTH QUESTIONNAIRE - PHQ9
SUM OF ALL RESPONSES TO PHQ QUESTIONS 1-9: 10
5. POOR APPETITE OR OVEREATING: NEARLY EVERY DAY

## 2024-07-18 NOTE — PATIENT INSTRUCTIONS
Thank you for trusting us with your care!     If you need to contact us for questions about:  Symptoms, Scheduling & Medical Questions; Non-urgent (2-3 day response) Tonja message, Urgent (needing response today) [phone] (if after 3:30pm next day response)   Prescriptions: Please call your Pharmacy   Billing: Jacey [phone] or [facility]:[phone]
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
## 2024-07-18 NOTE — NURSING NOTE

## 2024-07-19 LAB
C TRACH DNA SPEC QL NAA+PROBE: NEGATIVE
N GONORRHOEA DNA SPEC QL NAA+PROBE: NEGATIVE

## 2024-10-16 ENCOUNTER — TRANSFERRED RECORDS (OUTPATIENT)
Dept: HEALTH INFORMATION MANAGEMENT | Facility: CLINIC | Age: 24
End: 2024-10-16
Payer: COMMERCIAL

## 2024-11-01 ENCOUNTER — ALLIED HEALTH/NURSE VISIT (OUTPATIENT)
Dept: OBGYN | Facility: CLINIC | Age: 24
End: 2024-11-01
Payer: COMMERCIAL

## 2024-11-01 DIAGNOSIS — Z30.09 COUNSELING FOR INITIATION OF BIRTH CONTROL METHOD: Primary | ICD-10-CM

## 2024-11-01 PROCEDURE — 99207 PR NO CHARGE NURSE ONLY: CPT

## 2024-11-01 PROCEDURE — 96372 THER/PROPH/DIAG INJ SC/IM: CPT | Performed by: NURSE PRACTITIONER

## 2024-11-01 RX ADMIN — MEDROXYPROGESTERONE ACETATE 150 MG: 150 INJECTION, SUSPENSION INTRAMUSCULAR at 13:55

## 2024-11-01 NOTE — NURSING NOTE
Clinic Administered Medication Documentation      Depo Provera Documentation    Depo-Provera Standing Order inclusion/exclusion criteria reviewed.     Is this the initial or subsequent dose of Depo Provera? Subsequent dose - patient is within the acceptable window of time (11-15 weeks) for subsequent injection. Pregnancy test not indicated.    Patient meets: inclusion criteria   Left Glute  Is there an active order (written within the past 365 days, with administrations remaining, not ) in the chart? Yes.   Lot # [id number]  Exp: 2025  Prior to injection, verified patient identity using patient's name and date of birth. Medication was administered. Please see MAR and medication order for additional information.     Vial/Syringe: Single dose vial. Was entire vial of medication used? Yes    Patient instructed to report any adverse reaction to staff immediately.  NEXT INJECTION DUE: 25 - 25  (Pt given card)  Verified that the patient has refills remaining in their prescription.  Jessica Frye, CMA

## 2025-02-24 ENCOUNTER — OFFICE VISIT (OUTPATIENT)
Dept: OBGYN | Facility: CLINIC | Age: 25
End: 2025-02-24
Attending: ADVANCED PRACTICE MIDWIFE
Payer: COMMERCIAL

## 2025-02-24 VITALS
WEIGHT: 210 LBS | SYSTOLIC BLOOD PRESSURE: 116 MMHG | HEART RATE: 73 BPM | DIASTOLIC BLOOD PRESSURE: 76 MMHG | BODY MASS INDEX: 31.01 KG/M2

## 2025-02-24 DIAGNOSIS — Z30.9 ENCOUNTER FOR CONTRACEPTIVE MANAGEMENT, UNSPECIFIED TYPE: Primary | ICD-10-CM

## 2025-02-24 PROCEDURE — 96372 THER/PROPH/DIAG INJ SC/IM: CPT | Performed by: ADVANCED PRACTICE MIDWIFE

## 2025-02-24 PROCEDURE — 81025 URINE PREGNANCY TEST: CPT

## 2025-02-24 PROCEDURE — 250N000011 HC RX IP 250 OP 636: Performed by: ADVANCED PRACTICE MIDWIFE

## 2025-02-24 RX ORDER — MEDROXYPROGESTERONE ACETATE 150 MG/ML
150 INJECTION, SUSPENSION INTRAMUSCULAR
Status: ACTIVE | OUTPATIENT
Start: 2025-02-24 | End: 2025-11-21

## 2025-02-24 RX ADMIN — MEDROXYPROGESTERONE ACETATE 150 MG: 150 INJECTION, SUSPENSION INTRAMUSCULAR at 14:54

## 2025-02-24 NOTE — LETTER
2/24/2025       RE: Saritha Ferrera  [address]  [facility] [address]     Dear Colleague,    Thank you for referring your patient, Saritha Ferrera, to the [facility] [place] at [facility]. Please see a copy of my visit note below.    Nurse only visit  Desires Depo Provera Contraceptive Injection      Again, thank you for allowing me to participate in the care of your patient.      Sincerely,    [facility]

## 2025-02-24 NOTE — NURSING NOTE
Chief Complaint   Patient presents with    Contraception     depo     Clinic Administered Medication Documentation        Patient was given Depo Provera. Prior to medication administration, verified patient's identity using patient s name and date of birth. Please see MAR and medication order for additional information. Patient instructed to report any adverse reaction to staff immediately.    Vial/Syringe: Whole vial used    NEXT INJECTION DUE: 5/12/25 - 6/9/25

## 2025-02-24 NOTE — PATIENT INSTRUCTIONS
Thank you for trusting us with your care!   Please be aware, if you are on Mychart, you may see your results prior to your providers review. If labs are abnormal, we will call or message you on [employer] with a follow up plan.    If you need to contact us for questions about:  Symptoms, Scheduling & Medical Questions; Non-urgent (2-3 day response) Likeability message, Urgent (needing response today) [phone] (if after 3:30pm next day response)   Prescriptions: Please call your Pharmacy   Billing: Jacey [phone] or [facility]:[phone]

## 2025-05-12 ENCOUNTER — TRANSFERRED RECORDS (OUTPATIENT)
Dept: HEALTH INFORMATION MANAGEMENT | Facility: CLINIC | Age: 25
End: 2025-05-12
Payer: COMMERCIAL

## 2025-05-13 ENCOUNTER — ALLIED HEALTH/NURSE VISIT (OUTPATIENT)
Dept: INTERNAL MEDICINE | Facility: CLINIC | Age: 25
End: 2025-05-13
Payer: COMMERCIAL

## 2025-05-13 DIAGNOSIS — Z30.42 ENCOUNTER FOR SURVEILLANCE OF INJECTABLE CONTRACEPTIVE: Primary | ICD-10-CM

## 2025-05-13 PROCEDURE — 99207 PR NO CHARGE NURSE ONLY: CPT

## 2025-05-13 PROCEDURE — 96372 THER/PROPH/DIAG INJ SC/IM: CPT | Performed by: INTERNAL MEDICINE

## 2025-05-13 RX ADMIN — MEDROXYPROGESTERONE ACETATE 150 MG: 150 INJECTION, SUSPENSION INTRAMUSCULAR at 10:29

## 2025-05-13 NOTE — PROGRESS NOTES
Clinic Administered Medication Documentation      Depo Provera Documentation    Depo-Provera Standing Order inclusion/exclusion criteria reviewed.     Is this the initial or subsequent dose of Depo Provera? Subsequent dose - patient is within the acceptable window of time (11-15 weeks) for subsequent injection. Pregnancy test not indicated.    Patient meets: inclusion criteria     Is there an active order (written within the past 365 days, with administrations remaining, not ) in the chart? Yes.     Prior to injection, verified patient identity using patient's name and date of birth. Medication was administered. Please see MAR and medication order for additional information.     Vial/Syringe: Single dose vial. Was entire vial of medication used? Yes    Patient instructed to remain in clinic for 15 minutes and report any adverse reaction to staff immediately but patient declined.  NEXT INJECTION DUE: 25 - 25    Verified that the patient has refills remaining in their prescription.  Dunia Hsieh, CMA

## 2025-06-18 ENCOUNTER — PATIENT OUTREACH (OUTPATIENT)
Dept: CARE COORDINATION | Facility: CLINIC | Age: 25
End: 2025-06-18
Payer: COMMERCIAL

## 2025-07-02 ENCOUNTER — PATIENT OUTREACH (OUTPATIENT)
Dept: CARE COORDINATION | Facility: CLINIC | Age: 25
End: 2025-07-02
Payer: COMMERCIAL

## 2025-07-28 ENCOUNTER — MYC MEDICAL ADVICE (OUTPATIENT)
Dept: INTERNAL MEDICINE | Facility: CLINIC | Age: 25
End: 2025-07-28

## 2025-07-30 ENCOUNTER — ALLIED HEALTH/NURSE VISIT (OUTPATIENT)
Dept: INTERNAL MEDICINE | Facility: CLINIC | Age: 25
End: 2025-07-30
Payer: COMMERCIAL

## 2025-07-30 DIAGNOSIS — Z30.42 ENCOUNTER FOR SURVEILLANCE OF INJECTABLE CONTRACEPTIVE: Primary | ICD-10-CM

## 2025-07-30 PROCEDURE — 99207 PR NO CHARGE NURSE ONLY: CPT

## 2025-07-30 RX ADMIN — MEDROXYPROGESTERONE ACETATE 150 MG: 150 INJECTION, SUSPENSION INTRAMUSCULAR at 11:11

## 2025-07-30 NOTE — PROGRESS NOTES

## (undated) RX ORDER — MEDROXYPROGESTERONE ACETATE 150 MG/ML
INJECTION, SUSPENSION INTRAMUSCULAR
Status: DISPENSED
Start: 2025-02-24

## (undated) RX ORDER — MEDROXYPROGESTERONE ACETATE 150 MG/ML
INJECTION, SUSPENSION INTRAMUSCULAR
Status: DISPENSED
Start: 2024-07-18